# Patient Record
Sex: FEMALE | Race: WHITE | NOT HISPANIC OR LATINO | Employment: OTHER | URBAN - NONMETROPOLITAN AREA
[De-identification: names, ages, dates, MRNs, and addresses within clinical notes are randomized per-mention and may not be internally consistent; named-entity substitution may affect disease eponyms.]

---

## 2020-10-05 ENCOUNTER — TRANSCRIBE ORDERS (OUTPATIENT)
Dept: ADMINISTRATIVE | Facility: HOSPITAL | Age: 65
End: 2020-10-05

## 2020-10-05 ENCOUNTER — LAB (OUTPATIENT)
Dept: LAB | Facility: HOSPITAL | Age: 65
End: 2020-10-05

## 2020-10-05 DIAGNOSIS — E83.52 HYPERCALCEMIA: Primary | ICD-10-CM

## 2020-10-05 DIAGNOSIS — E83.52 HYPERCALCEMIA: ICD-10-CM

## 2020-10-05 LAB — TSH SERPL DL<=0.05 MIU/L-ACNC: 2.58 UIU/ML (ref 0.27–4.2)

## 2020-10-05 PROCEDURE — 84443 ASSAY THYROID STIM HORMONE: CPT

## 2020-10-05 PROCEDURE — 82652 VIT D 1 25-DIHYDROXY: CPT

## 2020-10-05 PROCEDURE — 82397 CHEMILUMINESCENT ASSAY: CPT

## 2020-10-05 PROCEDURE — 84165 PROTEIN E-PHORESIS SERUM: CPT

## 2020-10-05 PROCEDURE — 36415 COLL VENOUS BLD VENIPUNCTURE: CPT

## 2020-10-05 PROCEDURE — 84155 ASSAY OF PROTEIN SERUM: CPT

## 2020-10-06 LAB
ALBUMIN SERPL ELPH-MCNC: 3.9 G/DL (ref 2.9–4.4)
ALBUMIN/GLOB SERPL: 1.3 {RATIO} (ref 0.7–1.7)
ALPHA1 GLOB SERPL ELPH-MCNC: 0.3 G/DL (ref 0–0.4)
ALPHA2 GLOB SERPL ELPH-MCNC: 0.8 G/DL (ref 0.4–1)
B-GLOBULIN SERPL ELPH-MCNC: 1.1 G/DL (ref 0.7–1.3)
GAMMA GLOB SERPL ELPH-MCNC: 0.9 G/DL (ref 0.4–1.8)
GLOBULIN SER CALC-MCNC: 3 G/DL (ref 2.2–3.9)
LABORATORY COMMENT REPORT: NORMAL
M PROTEIN SERPL ELPH-MCNC: NORMAL G/DL
PROT SERPL-MCNC: 6.9 G/DL (ref 6–8.5)

## 2020-10-07 LAB — 1,25(OH)2D3 SERPL-MCNC: 53.3 PG/ML (ref 19.9–79.3)

## 2020-10-15 LAB — PTH RELATED PROT SERPL-SCNC: <2 PMOL/L

## 2021-01-28 ENCOUNTER — HOSPITAL ENCOUNTER (OUTPATIENT)
Dept: HOSPITAL 79 - OR | Age: 66
Discharge: HOME | End: 2021-01-28
Attending: INTERNAL MEDICINE
Payer: COMMERCIAL

## 2021-01-28 DIAGNOSIS — K31.89: ICD-10-CM

## 2021-01-28 DIAGNOSIS — E78.00: ICD-10-CM

## 2021-01-28 DIAGNOSIS — K29.61: ICD-10-CM

## 2021-01-28 DIAGNOSIS — I10: ICD-10-CM

## 2021-01-28 DIAGNOSIS — K70.31: ICD-10-CM

## 2021-01-28 DIAGNOSIS — K76.6: Primary | ICD-10-CM

## 2021-01-28 DIAGNOSIS — Z87.891: ICD-10-CM

## 2021-01-28 DIAGNOSIS — E78.5: ICD-10-CM

## 2021-01-28 DIAGNOSIS — Z79.899: ICD-10-CM

## 2021-01-28 DIAGNOSIS — Z79.4: ICD-10-CM

## 2021-01-28 DIAGNOSIS — E11.9: ICD-10-CM

## 2021-03-17 ENCOUNTER — IMMUNIZATION (OUTPATIENT)
Dept: VACCINE CLINIC | Facility: HOSPITAL | Age: 66
End: 2021-03-17

## 2021-03-17 PROCEDURE — 0001A: CPT | Performed by: INTERNAL MEDICINE

## 2021-03-17 PROCEDURE — 91300 HC SARSCOV02 VAC 30MCG/0.3ML IM: CPT | Performed by: INTERNAL MEDICINE

## 2021-04-07 ENCOUNTER — IMMUNIZATION (OUTPATIENT)
Dept: VACCINE CLINIC | Facility: HOSPITAL | Age: 66
End: 2021-04-07

## 2021-04-07 PROCEDURE — 0002A: CPT | Performed by: INTERNAL MEDICINE

## 2021-04-07 PROCEDURE — 91300 HC SARSCOV02 VAC 30MCG/0.3ML IM: CPT | Performed by: INTERNAL MEDICINE

## 2021-06-25 ENCOUNTER — TRANSCRIBE ORDERS (OUTPATIENT)
Dept: ADMINISTRATIVE | Facility: HOSPITAL | Age: 66
End: 2021-06-25

## 2021-06-25 DIAGNOSIS — Z87.891 PERSONAL HISTORY OF TOBACCO USE, PRESENTING HAZARDS TO HEALTH: Primary | ICD-10-CM

## 2021-07-02 ENCOUNTER — HOSPITAL ENCOUNTER (OUTPATIENT)
Dept: HOSPITAL 79 - US | Age: 66
End: 2021-07-02
Attending: INTERNAL MEDICINE
Payer: COMMERCIAL

## 2021-07-02 DIAGNOSIS — K76.0: ICD-10-CM

## 2021-07-02 DIAGNOSIS — Z90.49: ICD-10-CM

## 2021-07-02 DIAGNOSIS — K70.31: Primary | ICD-10-CM

## 2021-07-07 ENCOUNTER — HOSPITAL ENCOUNTER (OUTPATIENT)
Dept: CT IMAGING | Facility: HOSPITAL | Age: 66
Discharge: HOME | End: 2021-07-07
Admitting: PHYSICIAN ASSISTANT

## 2021-07-07 DIAGNOSIS — Z87.891 PERSONAL HISTORY OF TOBACCO USE, PRESENTING HAZARDS TO HEALTH: ICD-10-CM

## 2021-07-07 PROCEDURE — 71271 CT THORAX LUNG CANCER SCR C-: CPT | Performed by: RADIOLOGY

## 2021-07-07 PROCEDURE — 71271 CT THORAX LUNG CANCER SCR C-: CPT

## 2021-07-15 ENCOUNTER — NURSE NAVIGATOR (OUTPATIENT)
Dept: ONCOLOGY | Facility: HOSPITAL | Age: 66
End: 2021-07-15

## 2021-07-15 ENCOUNTER — TELEPHONE (OUTPATIENT)
Dept: GENERAL RADIOLOGY | Facility: HOSPITAL | Age: 66
End: 2021-07-15

## 2021-07-15 NOTE — PROGRESS NOTES
Nurse Navigator spoke to Kerry at [facility] regarding pt's LDCT scan results.  Pt qualifies to be referred to the lung nodule clinic.  Lung Nodule clinic contact informaion provided. Kerry sated she would make LITZY Zelaya aware.

## 2021-08-31 ENCOUNTER — HOSPITAL ENCOUNTER (OUTPATIENT)
Dept: MAMMOGRAPHY | Facility: HOSPITAL | Age: 66
Discharge: HOME | End: 2021-08-31

## 2021-08-31 ENCOUNTER — HOSPITAL ENCOUNTER (OUTPATIENT)
Dept: BONE DENSITY | Facility: HOSPITAL | Age: 66
Discharge: HOME | End: 2021-08-31

## 2021-08-31 DIAGNOSIS — Z78.0 ASYMPTOMATIC MENOPAUSAL STATE: ICD-10-CM

## 2021-08-31 DIAGNOSIS — Z12.31 VISIT FOR SCREENING MAMMOGRAM: ICD-10-CM

## 2021-08-31 PROCEDURE — 77067 SCR MAMMO BI INCL CAD: CPT | Performed by: RADIOLOGY

## 2021-08-31 PROCEDURE — 77067 SCR MAMMO BI INCL CAD: CPT

## 2021-08-31 PROCEDURE — 77063 BREAST TOMOSYNTHESIS BI: CPT

## 2021-08-31 PROCEDURE — 77063 BREAST TOMOSYNTHESIS BI: CPT | Performed by: RADIOLOGY

## 2021-08-31 PROCEDURE — 77080 DXA BONE DENSITY AXIAL: CPT

## 2021-08-31 PROCEDURE — 77080 DXA BONE DENSITY AXIAL: CPT | Performed by: RADIOLOGY

## 2022-01-19 ENCOUNTER — TRANSCRIBE ORDERS (OUTPATIENT)
Dept: ADMINISTRATIVE | Facility: HOSPITAL | Age: 67
End: 2022-01-19

## 2022-01-19 ENCOUNTER — LAB (OUTPATIENT)
Dept: LAB | Facility: HOSPITAL | Age: 67
End: 2022-01-19

## 2022-01-19 DIAGNOSIS — E83.52 HYPERCALCEMIA: ICD-10-CM

## 2022-01-19 DIAGNOSIS — E83.52 HYPERCALCEMIA: Primary | ICD-10-CM

## 2022-01-19 LAB
PHOSPHATE SERPL-MCNC: 2.5 MG/DL (ref 2.5–4.5)
PTH-INTACT SERPL-MCNC: 74.3 PG/ML (ref 15–65)
TSH SERPL DL<=0.05 MIU/L-ACNC: 2.4 UIU/ML (ref 0.27–4.2)

## 2022-01-19 PROCEDURE — 84443 ASSAY THYROID STIM HORMONE: CPT

## 2022-01-19 PROCEDURE — 36415 COLL VENOUS BLD VENIPUNCTURE: CPT

## 2022-01-19 PROCEDURE — 84100 ASSAY OF PHOSPHORUS: CPT

## 2022-01-19 PROCEDURE — 83970 ASSAY OF PARATHORMONE: CPT

## 2022-01-20 ENCOUNTER — LAB (OUTPATIENT)
Dept: LAB | Facility: HOSPITAL | Age: 67
End: 2022-01-20

## 2022-01-20 DIAGNOSIS — E83.52 HYPERCALCEMIA: ICD-10-CM

## 2022-01-20 PROCEDURE — 81050 URINALYSIS VOLUME MEASURE: CPT

## 2022-01-20 PROCEDURE — 82340 ASSAY OF CALCIUM IN URINE: CPT

## 2022-01-21 LAB
CALCIUM 24H UR-MCNC: 54.8 MG/DL
CALCIUM 24H UR-MRATE: 465.8 MG/24 HR (ref 100–300)
COLLECT DURATION TIME UR: 24 HRS
SPECIMEN VOL 24H UR: 850 ML

## 2022-01-31 ENCOUNTER — TRANSCRIBE ORDERS (OUTPATIENT)
Dept: ADMINISTRATIVE | Facility: HOSPITAL | Age: 67
End: 2022-01-31

## 2022-01-31 DIAGNOSIS — R91.8 PULMONARY NODULES: Primary | ICD-10-CM

## 2022-02-03 ENCOUNTER — OFFICE VISIT (OUTPATIENT)
Dept: ENDOCRINOLOGY | Facility: CLINIC | Age: 67
End: 2022-02-03

## 2022-02-03 ENCOUNTER — LAB (OUTPATIENT)
Dept: LAB | Facility: HOSPITAL | Age: 67
End: 2022-02-03

## 2022-02-03 VITALS
SYSTOLIC BLOOD PRESSURE: 164 MMHG | OXYGEN SATURATION: 97 % | DIASTOLIC BLOOD PRESSURE: 74 MMHG | HEIGHT: 63 IN | WEIGHT: 184 LBS | TEMPERATURE: 98.1 F | HEART RATE: 77 BPM | BODY MASS INDEX: 32.6 KG/M2

## 2022-02-03 DIAGNOSIS — E21.3 HYPERPARATHYROIDISM: Primary | ICD-10-CM

## 2022-02-03 PROCEDURE — 99204 OFFICE O/P NEW MOD 45 MIN: CPT | Performed by: NURSE PRACTITIONER

## 2022-02-03 PROCEDURE — 82330 ASSAY OF CALCIUM: CPT | Performed by: NURSE PRACTITIONER

## 2022-02-03 PROCEDURE — 36415 COLL VENOUS BLD VENIPUNCTURE: CPT | Performed by: NURSE PRACTITIONER

## 2022-02-03 RX ORDER — LANCETS
EACH MISCELLANEOUS
Status: ON HOLD | COMMUNITY
Start: 2022-01-31 | End: 2022-09-07

## 2022-02-03 RX ORDER — FUROSEMIDE 40 MG/1
40 TABLET ORAL DAILY
COMMUNITY
Start: 2022-01-14

## 2022-02-03 RX ORDER — INSULIN DEGLUDEC INJECTION 100 U/ML
18 INJECTION, SOLUTION SUBCUTANEOUS NIGHTLY
COMMUNITY
Start: 2021-11-22 | End: 2022-08-03 | Stop reason: SDUPTHER

## 2022-02-03 RX ORDER — FELODIPINE 5 MG/1
5 TABLET, EXTENDED RELEASE ORAL DAILY
COMMUNITY
Start: 2022-01-14

## 2022-02-03 RX ORDER — CHOLECALCIFEROL (VITAMIN D3) 50 MCG
2000 TABLET ORAL WEEKLY
COMMUNITY

## 2022-02-03 RX ORDER — LINAGLIPTIN 5 MG/1
5 TABLET, FILM COATED ORAL DAILY
COMMUNITY
Start: 2022-01-14 | End: 2022-08-03 | Stop reason: SDUPTHER

## 2022-02-03 RX ORDER — EZETIMIBE 10 MG/1
10 TABLET ORAL DAILY
COMMUNITY
Start: 2022-01-15

## 2022-02-03 RX ORDER — CITALOPRAM 40 MG/1
40 TABLET ORAL DAILY
COMMUNITY
Start: 2022-01-14

## 2022-02-03 RX ORDER — FLURBIPROFEN SODIUM 0.3 MG/ML
SOLUTION/ DROPS OPHTHALMIC
COMMUNITY
Start: 2022-01-14 | End: 2022-08-03 | Stop reason: SDUPTHER

## 2022-02-03 NOTE — ASSESSMENT & PLAN NOTE
Discussed in length with the patient her lab findings.  Based on elevated calcium, osteoporosis, increased urine calcium and her symptoms, she will qualify for surgical evaluation.  Will refer to ENT for surgical eval.  Follow-up in 6 months.

## 2022-02-03 NOTE — PROGRESS NOTES
Chief Complaint   Patient presents with   • Abnormal Lab     initial encounter        Referring Provider  Taylor Zuniga, FIDELINA YATES   Diana Cassidy is a 66 y.o. female had concerns including Abnormal Lab (initial encounter).      Reports that back in the 1990's she had high calcium on her labs and that she has never been evaluated for this until her most recent visit with a new PCP and they recommended referral to endo for further evaluation.    Her most recent labs are as follows:  1/20/2022  24 urine calcium: 465.8 H (100-300)  PTH: 74.3 H (15-65)  1/14/2022  Calcium: 12.5 H (8.7-10.3)  Vit D: 39.6 ()  08/2021: DEXA scan resulted osteoporotic with high fracture risk.  She has not had an ionized calcium.     Patient reports that diagnosis of hyperparathyroidism was made 90's  Patient is not taking calcium supplements or OTC medications containing calcium, such as TUMS.   Patient is taking vitamin D supplementation.    Patient has history of hypertension treated with HCTZ in the past, not in current medication regimen.  Patient has no history of kidney dysfunction.  Patient has no history of kidney stones.  Patient has history of osteoporosis.  Patient has no family history of calcium disorders.  Patient reports <1 servings per day of dairy.     No past medical history on file.  No past surgical history on file.   Family History   Problem Relation Age of Onset   • Breast cancer Neg Hx       Social History     Socioeconomic History   • Marital status:       No Known Allergies   Current Outpatient Medications on File Prior to Visit   Medication Sig Dispense Refill   • Accu-Chek Softclix Lancets lancets      • B-D UF III MINI PEN NEEDLES 31G X 5 MM misc      • Cholecalciferol (Vitamin D) 50 MCG (2000 UT) tablet Take 2,000 Units by mouth Daily.     • citalopram (CeleXA) 40 MG tablet      • ezetimibe (ZETIA) 10 MG tablet      • felodipine (PLENDIL) 5 MG 24 hr tablet      • furosemide (LASIX) 40 MG  "tablet      • metFORMIN (GLUCOPHAGE) 1000 MG tablet      • metoprolol tartrate (LOPRESSOR) 25 MG tablet      • Tradjenta 5 MG tablet tablet      • Tresiba FlexTouch 100 UNIT/ML solution pen-injector injection        No current facility-administered medications on file prior to visit.        The following portions of the patient's history were reviewed and updated as appropriate: allergies, current medications, past family history, past medical history, past social history, past surgical history and problem list.    Review of Systems   Constitutional: Positive for fatigue. Negative for activity change, appetite change, chills, diaphoresis, fever, unexpected weight gain and unexpected weight loss.   Eyes: Positive for blurred vision and visual disturbance.   Cardiovascular: Negative for palpitations.   Gastrointestinal: Positive for diarrhea.   Endocrine: Negative for cold intolerance, heat intolerance, polydipsia, polyphagia and polyuria.   Musculoskeletal: Negative.    Skin: Positive for dry skin.   Neurological: Positive for dizziness, weakness, light-headedness, numbness, memory problem and confusion. Negative for headache.   Hematological: Bruises/bleeds easily.   Psychiatric/Behavioral: Negative for agitation and sleep disturbance. The patient is not nervous/anxious.    All other systems reviewed and are negative.     /74 (BP Location: Right arm, Patient Position: Sitting, Cuff Size: Adult)   Pulse 77   Temp 98.1 °F (36.7 °C) (Oral)   Ht 160 cm (63\")   Wt 83.5 kg (184 lb)   LMP  (LMP Unknown)   SpO2 97%   Breastfeeding No   BMI 32.59 kg/m²      Physical Exam  Vitals reviewed.   Constitutional:       Appearance: Normal appearance.   Eyes:      Extraocular Movements: Extraocular movements intact.   Neck:      Thyroid: No thyroid mass or thyroid tenderness.      Comments: Mild thyromegaly, no palpable nodules.  Cardiovascular:      Rate and Rhythm: Normal rate and regular rhythm.      Pulses: Normal " pulses.      Heart sounds: Normal heart sounds.   Pulmonary:      Effort: Pulmonary effort is normal.      Breath sounds: Normal breath sounds.   Skin:     General: Skin is warm.   Neurological:      Mental Status: She is alert and oriented to person, place, and time.   Psychiatric:         Mood and Affect: Mood normal.         Behavior: Behavior normal.         Thought Content: Thought content normal.         Judgment: Judgment normal.        Labs/Imaging  CMP:  Lab Results   Component Value Date    PROTENTOTREF 6.9 10/05/2020    ALBUMIN 3.9 10/05/2020    LABGLOBREF 3.0 10/05/2020    LABIL2 1.3 10/05/2020     Lipid Panel:  No results found for: CHOL, TRIG, HDL, VLDL, LDL  HbA1c:     Glucose:  No results found for: POCGLU  TSH:  Lab Results   Component Value Date    TSH 2.400 01/19/2022       Assessment and Plan    Diagnoses and all orders for this visit:    1. Hyperparathyroidism (HCC) (Primary)  Assessment & Plan:  Discussed in length with the patient her lab findings.  Based on elevated calcium, osteoporosis, increased urine calcium and her symptoms, she will qualify for surgical evaluation.  Will refer to ENT for surgical eval.  Follow-up in 6 months.    Orders:  -     Calcium, Ionized  -     Ambulatory Referral to ENT (Otolaryngology)       Return in about 6 months (around 8/3/2022) for Follow-up appointment. The patient was instructed to contact the clinic with any interval questions or concerns.    FIDELINA Marks   Endocrinology

## 2022-02-04 LAB — CA-I SERPL ISE-MCNC: 6.7 MG/DL (ref 4.5–5.6)

## 2022-02-11 ENCOUNTER — HOSPITAL ENCOUNTER (OUTPATIENT)
Dept: CT IMAGING | Facility: HOSPITAL | Age: 67
Discharge: HOME | End: 2022-02-11
Admitting: INTERNAL MEDICINE

## 2022-02-11 DIAGNOSIS — R91.8 PULMONARY NODULES: ICD-10-CM

## 2022-02-11 PROCEDURE — 71250 CT THORAX DX C-: CPT

## 2022-02-11 PROCEDURE — 71250 CT THORAX DX C-: CPT | Performed by: RADIOLOGY

## 2022-03-03 ENCOUNTER — TRANSCRIBE ORDERS (OUTPATIENT)
Dept: ADMINISTRATIVE | Facility: HOSPITAL | Age: 67
End: 2022-03-03

## 2022-03-03 DIAGNOSIS — D35.1 PARATHYROID ADENOMA: Primary | ICD-10-CM

## 2022-03-21 ENCOUNTER — HOSPITAL ENCOUNTER (OUTPATIENT)
Dept: NUCLEAR MEDICINE | Facility: HOSPITAL | Age: 67
Discharge: HOME | End: 2022-03-21

## 2022-03-21 ENCOUNTER — HOSPITAL ENCOUNTER (OUTPATIENT)
Dept: ULTRASOUND IMAGING | Facility: HOSPITAL | Age: 67
Discharge: HOME | End: 2022-03-21

## 2022-03-21 DIAGNOSIS — D35.1 PARATHYROID ADENOMA: ICD-10-CM

## 2022-03-21 PROCEDURE — 76536 US EXAM OF HEAD AND NECK: CPT | Performed by: RADIOLOGY

## 2022-03-21 PROCEDURE — 78070 PARATHYROID PLANAR IMAGING: CPT

## 2022-03-21 PROCEDURE — 78070 PARATHYROID PLANAR IMAGING: CPT | Performed by: RADIOLOGY

## 2022-03-21 PROCEDURE — A9500 TC99M SESTAMIBI: HCPCS | Performed by: OTOLARYNGOLOGY

## 2022-03-21 PROCEDURE — 76536 US EXAM OF HEAD AND NECK: CPT

## 2022-03-21 PROCEDURE — 0 TECHNETIUM SESTAMIBI: Performed by: OTOLARYNGOLOGY

## 2022-03-21 RX ADMIN — TECHNETIUM TC 99M SESTAMIBI 1 DOSE: 1 INJECTION INTRAVENOUS at 09:03

## 2022-07-06 ENCOUNTER — HOSPITAL ENCOUNTER (OUTPATIENT)
Dept: HOSPITAL 79 - US | Age: 67
End: 2022-07-06
Attending: INTERNAL MEDICINE
Payer: COMMERCIAL

## 2022-07-06 DIAGNOSIS — K70.30: Primary | ICD-10-CM

## 2022-08-03 ENCOUNTER — SPECIALTY PHARMACY (OUTPATIENT)
Dept: PHARMACY | Facility: HOSPITAL | Age: 67
End: 2022-08-03

## 2022-08-03 ENCOUNTER — LAB (OUTPATIENT)
Dept: LAB | Facility: HOSPITAL | Age: 67
End: 2022-08-03

## 2022-08-03 ENCOUNTER — OFFICE VISIT (OUTPATIENT)
Dept: ENDOCRINOLOGY | Facility: CLINIC | Age: 67
End: 2022-08-03

## 2022-08-03 VITALS
WEIGHT: 187 LBS | HEART RATE: 74 BPM | SYSTOLIC BLOOD PRESSURE: 142 MMHG | OXYGEN SATURATION: 96 % | DIASTOLIC BLOOD PRESSURE: 70 MMHG | BODY MASS INDEX: 33.13 KG/M2 | HEIGHT: 63 IN

## 2022-08-03 DIAGNOSIS — E21.3 HYPERPARATHYROIDISM: Primary | ICD-10-CM

## 2022-08-03 DIAGNOSIS — E11.65 TYPE 2 DIABETES MELLITUS WITH HYPERGLYCEMIA, WITH LONG-TERM CURRENT USE OF INSULIN: Primary | ICD-10-CM

## 2022-08-03 DIAGNOSIS — Z79.4 TYPE 2 DIABETES MELLITUS WITH HYPERGLYCEMIA, WITH LONG-TERM CURRENT USE OF INSULIN: Primary | ICD-10-CM

## 2022-08-03 DIAGNOSIS — E11.65 TYPE 2 DIABETES MELLITUS WITH HYPERGLYCEMIA, WITH LONG-TERM CURRENT USE OF INSULIN: ICD-10-CM

## 2022-08-03 DIAGNOSIS — Z79.4 TYPE 2 DIABETES MELLITUS WITH HYPERGLYCEMIA, WITH LONG-TERM CURRENT USE OF INSULIN: ICD-10-CM

## 2022-08-03 LAB
ALBUMIN SERPL-MCNC: 4.44 G/DL (ref 3.5–5.2)
ALBUMIN/GLOB SERPL: 2.1 G/DL
ALP SERPL-CCNC: 70 U/L (ref 39–117)
ALT SERPL W P-5'-P-CCNC: 23 U/L (ref 1–33)
ANION GAP SERPL CALCULATED.3IONS-SCNC: 9.1 MMOL/L (ref 5–15)
AST SERPL-CCNC: 35 U/L (ref 1–32)
BILIRUB SERPL-MCNC: 0.2 MG/DL (ref 0–1.2)
BUN SERPL-MCNC: 16 MG/DL (ref 8–23)
BUN/CREAT SERPL: 23.9 (ref 7–25)
CALCIUM SPEC-SCNC: 12.1 MG/DL (ref 8.6–10.5)
CHLORIDE SERPL-SCNC: 104 MMOL/L (ref 98–107)
CO2 SERPL-SCNC: 25.9 MMOL/L (ref 22–29)
CREAT SERPL-MCNC: 0.67 MG/DL (ref 0.57–1)
EGFRCR SERPLBLD CKD-EPI 2021: 94.7 ML/MIN/1.73
EXPIRATION DATE: ABNORMAL
GLOBULIN UR ELPH-MCNC: 2.2 GM/DL
GLUCOSE BLDC GLUCOMTR-MCNC: 247 MG/DL (ref 70–130)
GLUCOSE SERPL-MCNC: 228 MG/DL (ref 65–99)
HBA1C MFR BLD: 8.4 %
Lab: ABNORMAL
POTASSIUM SERPL-SCNC: 4.7 MMOL/L (ref 3.5–5.2)
PROT SERPL-MCNC: 6.6 G/DL (ref 6–8.5)
PTH-INTACT SERPL-MCNC: 65.9 PG/ML (ref 15–65)
SODIUM SERPL-SCNC: 139 MMOL/L (ref 136–145)

## 2022-08-03 PROCEDURE — 36415 COLL VENOUS BLD VENIPUNCTURE: CPT | Performed by: NURSE PRACTITIONER

## 2022-08-03 PROCEDURE — 83970 ASSAY OF PARATHORMONE: CPT | Performed by: NURSE PRACTITIONER

## 2022-08-03 PROCEDURE — 99214 OFFICE O/P EST MOD 30 MIN: CPT | Performed by: NURSE PRACTITIONER

## 2022-08-03 PROCEDURE — 80053 COMPREHEN METABOLIC PANEL: CPT | Performed by: NURSE PRACTITIONER

## 2022-08-03 PROCEDURE — 83036 HEMOGLOBIN GLYCOSYLATED A1C: CPT | Performed by: NURSE PRACTITIONER

## 2022-08-03 PROCEDURE — 82962 GLUCOSE BLOOD TEST: CPT | Performed by: NURSE PRACTITIONER

## 2022-08-03 PROCEDURE — 3052F HG A1C>EQUAL 8.0%<EQUAL 9.0%: CPT | Performed by: NURSE PRACTITIONER

## 2022-08-03 RX ORDER — INSULIN DEGLUDEC INJECTION 100 U/ML
18 INJECTION, SOLUTION SUBCUTANEOUS NIGHTLY
Qty: 15 ML | Refills: 1 | Status: SHIPPED | OUTPATIENT
Start: 2022-08-03 | End: 2022-11-03 | Stop reason: SDUPTHER

## 2022-08-03 RX ORDER — LINAGLIPTIN 5 MG/1
5 TABLET, FILM COATED ORAL DAILY
Qty: 90 TABLET | Refills: 1 | Status: SHIPPED | OUTPATIENT
Start: 2022-08-03 | End: 2022-11-03 | Stop reason: SDUPTHER

## 2022-08-03 RX ORDER — FLURBIPROFEN SODIUM 0.3 MG/ML
SOLUTION/ DROPS OPHTHALMIC
Qty: 100 EACH | Refills: 1 | Status: ON HOLD | OUTPATIENT
Start: 2022-08-03 | End: 2022-09-07

## 2022-08-03 RX ORDER — DAPAGLIFLOZIN 5 MG/1
5 TABLET, FILM COATED ORAL DAILY
Qty: 90 TABLET | Refills: 1 | Status: SHIPPED | OUTPATIENT
Start: 2022-08-03 | End: 2022-11-03

## 2022-08-03 NOTE — ASSESSMENT & PLAN NOTE
-Diabetes is above goal with A1c 8.4.  -Discussed dietary and exercise guidelines.  She would like to speak with diabetes education.  Will place referral for this.  -Discussed importance of yearly eye exams.  -Discussed importance of checking BG's regularly.  -Continue Metformin 1,000 mg BID.  -Continue Tradjenta 5 mg QD.  -Continue Tresiba 18 units qhs.  -Start Farxiga 5 mg QD. Discussed the medication's MOA and that it may cause more frequent urination particularly upon starting the medication. Take one tablet in the morning with or without food. Encouraged to drink plenty of water as to not get dehydrated especially in warmer weather or with activity. Also discussed there may be an increased incidence of UTIs or yeast infections and to monitor for signs/symptoms.  -S/S hypoglycemia reviewed with Rule of 15's advised.  -Follow-up in 3 months.

## 2022-08-03 NOTE — PROGRESS NOTES
Chief Complaint   Patient presents with   • Hyperthyroidism        Diana Cassidy is a 67 y.o. female had concerns including Hyperthyroidism.    She is being seen for her hyperparathyroidism.  She is asking us to start caring for her diabetes as well.    She would like for us to treat her diabetes as well.     Diabetes was diagnosed .  Complications include none.  Last ophtho exam was , Dr. Morillo.  Current medications for diabetes include Metformin 1,000  Mg BID, Tradjenta 5 mg QD, Tresiba 18 units qhs.  She checks her blood sugar one times per day.   Hypos: none  FSB+  PPD: 200+    Diet: She eats what she wants, she is not limiting much.    She is scheduled for surgery on her parathyroids on .  She is having surgery here at [facility].     Reports that back in the  she had high calcium on her labs and that she has never been evaluated for this until her most recent visit with a new PCP and they recommended referral to endo for further evaluation.    Her most recent labs are as follows:  2022  24 urine calcium: 465.8 H (100-300)  PTH: 74.3 H (15-65)  2022  Calcium: 12.5 H (8.7-10.3)  Vit D: 39.6 ()  2021: DEXA scan resulted osteoporotic with high fracture risk.  She has not had an ionized calcium.     Patient reports that diagnosis of hyperparathyroidism was made   Patient is not taking calcium supplements or OTC medications containing calcium, such as TUMS.   Patient is taking vitamin D supplementation.    Patient has history of hypertension treated with HCTZ in the past, not in current medication regimen.  Patient has no history of kidney dysfunction.  Patient has no history of kidney stones.  Patient has history of osteoporosis.  Patient has no family history of calcium disorders.  Patient reports <1 servings per day of dairy.     Past Medical History:   Diagnosis Date   • Hyperlipidemia NA    Don't remember   • Hypertension    • Vitamin D deficiency  2020     No past surgical history on file.   Family History   Problem Relation Age of Onset   • Cancer Mother         Lung cancer   • Cancer Father         Pancreatic/ Lung Cancer   • Cancer Sister         Lymphoma/bladder   • Cancer Sister         Bladder Cancer   • Breast cancer Neg Hx       Social History     Socioeconomic History   • Marital status:    Tobacco Use   • Smoking status: Former Smoker     Packs/day: 1.50     Years: 30.00     Pack years: 45.00     Types: Cigarettes     Quit date: 5/9/2012     Years since quitting: 10.2   Substance and Sexual Activity   • Alcohol use: Not Currently   • Drug use: Never   • Sexual activity: Not Currently     Birth control/protection: Surgical      No Known Allergies   Current Outpatient Medications on File Prior to Visit   Medication Sig Dispense Refill   • Accu-Chek Softclix Lancets lancets      • Cholecalciferol (Vitamin D) 50 MCG (2000 UT) tablet Take 2,000 Units by mouth Daily.     • citalopram (CeleXA) 40 MG tablet      • ezetimibe (ZETIA) 10 MG tablet      • felodipine (PLENDIL) 5 MG 24 hr tablet      • furosemide (LASIX) 40 MG tablet      • metoprolol tartrate (LOPRESSOR) 25 MG tablet Take 25 mg by mouth 2 (Two) Times a Day.     • [DISCONTINUED] B-D UF III MINI PEN NEEDLES 31G X 5 MM misc      • [DISCONTINUED] metFORMIN (GLUCOPHAGE) 1000 MG tablet Take 1,000 mg by mouth 2 (Two) Times a Day With Meals.     • [DISCONTINUED] Tradjenta 5 MG tablet tablet 5 mg Daily.     • [DISCONTINUED] Tresiba FlexTouch 100 UNIT/ML solution pen-injector injection Inject 18 Units under the skin into the appropriate area as directed Every Night.       No current facility-administered medications on file prior to visit.        The following portions of the patient's history were reviewed and updated as appropriate: allergies, current medications, past family history, past medical history, past social history, past surgical history and problem list.    Review of Systems  "  Constitutional: Positive for fatigue. Negative for activity change, appetite change, chills, diaphoresis, fever, unexpected weight gain and unexpected weight loss.   Eyes: Positive for blurred vision and visual disturbance.   Cardiovascular: Negative for palpitations.   Gastrointestinal: Positive for diarrhea.   Endocrine: Negative for cold intolerance, heat intolerance, polydipsia, polyphagia and polyuria.   Musculoskeletal: Negative.    Skin: Positive for dry skin.   Neurological: Positive for dizziness, weakness, light-headedness, numbness, memory problem and confusion. Negative for headache.   Hematological: Bruises/bleeds easily.   Psychiatric/Behavioral: Negative for agitation and sleep disturbance. The patient is not nervous/anxious.    All other systems reviewed and are negative.     /70 (BP Location: Right arm, Patient Position: Sitting, Cuff Size: Adult)   Pulse 74   Ht 160 cm (63\")   Wt 84.8 kg (187 lb)   LMP  (LMP Unknown)   SpO2 96%   BMI 33.13 kg/m²      Physical Exam  Vitals reviewed.   Constitutional:       Appearance: Normal appearance.   Eyes:      Extraocular Movements: Extraocular movements intact.   Neck:      Thyroid: No thyroid mass or thyroid tenderness.      Comments: Mild thyromegaly, no palpable nodules.  Cardiovascular:      Rate and Rhythm: Normal rate and regular rhythm.      Pulses: Normal pulses.           Dorsalis pedis pulses are 2+ on the right side and 2+ on the left side.        Posterior tibial pulses are 2+ on the right side and 2+ on the left side.      Heart sounds: Normal heart sounds.   Pulmonary:      Effort: Pulmonary effort is normal.      Breath sounds: Normal breath sounds.   Feet:      Right foot:      Protective Sensation: 10 sites tested. 10 sites sensed.      Skin integrity: Dry skin present.      Toenail Condition: Right toenails are normal.      Left foot:      Protective Sensation: 10 sites tested. 10 sites sensed.      Skin integrity: Dry skin " present.      Toenail Condition: Left toenails are normal.      Comments: Diabetic Foot Exam Performed and Monofilament Test Performed  Skin:     General: Skin is warm.   Neurological:      Mental Status: She is alert and oriented to person, place, and time.   Psychiatric:         Mood and Affect: Mood normal.         Behavior: Behavior normal.         Thought Content: Thought content normal.         Judgment: Judgment normal.        Labs/Imaging  CMP:  Lab Results   Component Value Date    BUN 16 08/03/2022    CREATININE 0.67 08/03/2022    BCR 23.9 08/03/2022     08/03/2022    K 4.7 08/03/2022    CO2 25.9 08/03/2022    CALCIUM 12.1 (H) 08/03/2022    PROTENTOTREF 6.9 10/05/2020    ALBUMIN 4.44 08/03/2022    LABGLOBREF 3.0 10/05/2020    LABIL2 1.3 10/05/2020    BILITOT 0.2 08/03/2022    ALKPHOS 70 08/03/2022    AST 35 (H) 08/03/2022    ALT 23 08/03/2022     Lipid Panel:  No results found for: CHOL, TRIG, HDL, VLDL, LDL  HbA1c:  Lab Results   Component Value Date    HGBA1C 8.4 08/03/2022     Glucose:  Lab Results   Component Value Date    POCGLU 247 (A) 08/03/2022     TSH:  Lab Results   Component Value Date    TSH 2.400 01/19/2022       Assessment and Plan    Diagnoses and all orders for this visit:    1. Hyperparathyroidism (HCC) (Primary)  Assessment & Plan:  Patient is to continue with surgical procedure upcoming.  Will monitor and follow as indicated.    Orders:  -     Comprehensive Metabolic Panel  -     PTH, Intact    2. Type 2 diabetes mellitus with hyperglycemia, with long-term current use of insulin (HCC)  Assessment & Plan:  -Diabetes is above goal with A1c 8.4.  -Discussed dietary and exercise guidelines.  She would like to speak with diabetes education.  Will place referral for this.  -Discussed importance of yearly eye exams.  -Discussed importance of checking BG's regularly.  -Continue Metformin 1,000 mg BID.  -Continue Tradjenta 5 mg QD.  -Continue Tresiba 18 units qhs.  -Start Farxiga 5 mg QD.  Discussed the medication's MOA and that it may cause more frequent urination particularly upon starting the medication. Take one tablet in the morning with or without food. Encouraged to drink plenty of water as to not get dehydrated especially in warmer weather or with activity. Also discussed there may be an increased incidence of UTIs or yeast infections and to monitor for signs/symptoms.  -S/S hypoglycemia reviewed with Rule of 15's advised.  -Follow-up in 3 months.      Orders:  -     POC Glucose Fingerstick  -     POC Glycosylated Hemoglobin (Hb A1C)  -     Ambulatory Referral to Diabetic Education       Return in about 3 months (around 11/3/2022) for Follow-up appointment, A1C. The patient was instructed to contact the clinic with any interval questions or concerns.    This document has been electronically signed by FIDELINA Marks  August 3, 2022 12:46 EDT  Endocrinology

## 2022-08-03 NOTE — PROGRESS NOTES
Specialty Pharmacy Patient Management Program  Endocrinology Initial Assessment       Diana Cassidy is a 67 y.o. female with Type 2 Diabetes seen by an Endocrinology provider and enrolled in the Endocrinology Patient Management program offered by [facility].  An initial outreach was conducted, including assessment of therapy appropriateness and specialty medication education for Tradjenta, Metformin, and Tresiba. The patient was introduced to services offered by [facility], including: regular assessments, refill coordination, curbside pick-up or mail order delivery options, prior authorization maintenance, and financial assistance programs as applicable. The patient was also provided with contact information for the pharmacy team.     Patient is currently taking Metformin 1,000 mg twice daily, Tradjenta 5 mg daily, and Tresiba 18 units at night. She checks her BG once daily with BG >200. She denies low BG <70.     Patient denies personal/family history of thyroid cancer, denies issues with pancreas/pancreatitis, and denies issues with recurrent UTI/yeast infections. She hasn't tried any other medications in the past. In the past she was misdiagnosed with cirrhosis of the liver. Patient states that the specialist she sees now informed her of the misdiagnosis. He routinely follows her labs.     Insurance Coverage & Financial Support  Medicare - Patient needs assistance with medication costs.     Relevant Past Medical History and Comorbidities  Relevant medical history and concomitant health conditions were discussed with the patient. The patient's chart has been reviewed for relevant past medical history and comorbid health conditions and updated as necessary.   Past Medical History:   Diagnosis Date   • Hyperlipidemia NA    Don't remember   • Hypertension 2007   • Vitamin D deficiency 2020     Social History     Socioeconomic History   • Marital status:     Tobacco Use   • Smoking status: Former Smoker     Packs/day: 1.50     Years: 30.00     Pack years: 45.00     Types: Cigarettes     Quit date: 5/9/2012     Years since quitting: 10.2   Substance and Sexual Activity   • Alcohol use: Not Currently   • Drug use: Never   • Sexual activity: Not Currently     Birth control/protection: Surgical       Allergies  Known allergies and reactions were discussed with the patient. The patient's chart has been reviewed for  allergy information and updated as necessary.   Patient has no known allergies.    Current Medication List  This medication list has been reviewed with the patient and evaluated for any interactions or necessary modifications/recommendations, and updated to include all prescription medications, OTC medications, and supplements the patient is currently taking.  This list reflects what is contained in the patient's profile, which has also been marked as reviewed to communicate to other providers it is the most up to date version of the patient's current medication therapy.     Current Outpatient Medications:   •  Accu-Chek Softclix Lancets lancets, , Disp: , Rfl:   •  B-D UF III MINI PEN NEEDLES 31G X 5 MM misc, , Disp: , Rfl:   •  Cholecalciferol (Vitamin D) 50 MCG (2000 UT) tablet, Take 2,000 Units by mouth Daily., Disp: , Rfl:   •  citalopram (CeleXA) 40 MG tablet, , Disp: , Rfl:   •  ezetimibe (ZETIA) 10 MG tablet, , Disp: , Rfl:   •  felodipine (PLENDIL) 5 MG 24 hr tablet, , Disp: , Rfl:   •  furosemide (LASIX) 40 MG tablet, , Disp: , Rfl:   •  metFORMIN (GLUCOPHAGE) 1000 MG tablet, Take 1,000 mg by mouth 2 (Two) Times a Day With Meals., Disp: , Rfl:   •  metoprolol tartrate (LOPRESSOR) 25 MG tablet, Take 25 mg by mouth 2 (Two) Times a Day., Disp: , Rfl:   •  Tradjenta 5 MG tablet tablet, 5 mg Daily., Disp: , Rfl:   •  Tresiba FlexTouch 100 UNIT/ML solution pen-injector injection, Inject 18 Units under the skin into the appropriate area as directed Every  Night., Disp: , Rfl:     Drug Interactions  None relevant to diabetes care    Recommended Medications Assessment  • Aspirin - Indicated, not currently taking  • Statin - Indicated, not currently taking  • ACEi/ARB - Indicated, not currently taking    Current 10-Year ASCVD Risk: cannot calculate - need updated labs.     Relevant Laboratory Values  A1C Last 3 Results    HGBA1C Last 3 Results 8/3/22   Hemoglobin A1C 8.4           Lab Results   Component Value Date    HGBA1C 8.4 08/03/2022     No results found for: GLUCOSE, CALCIUM, NA, K, CO2, CL, BUN, CREATININE, EGFRIFAFRI, EGFRIFNONA, BCR, ANIONGAP  No results found for: CHOL, CHLPL, TRIG, HDL, LDL, LDLDIRECT      Initial Education Provided for Specialty Medication  The patient has been provided with the following education and any applicable administration techniques (i.e. self-injection) have been demonstrated for the therapies indicated. All questions and concerns have been addressed prior to the patient receiving the medication, and the patient has verbalized understanding of the education and any materials provided.  Additional patient education shall be provided and documented upon request by the patient, provider or payer.      TRESIBA® (insulin degludec)   Medication Expectations    Why am I taking this medication?  You are taking this medication to lower blood sugar and A1c because you have type 1 or type 2 diabetes. Diabetes is not curable but with proper medication and treatment, we can keep your blood sugar within your personalized target range.    What should I expect while on this medication?  You should expect to see your blood sugar and A1c decrease over time.    How does the medication work?  Tresiba is a long-acting insulin that slowly and steadily releases after administration for 24-hours of blood sugar control. Insulin helps the sugar in your blood get into cells and provide them with energy to fuel your body.     How long will I be on this  medication for?  If you have Type 1 diabetes, you will be on this medication or another insulin throughout your lifetime because your body cannot make its own insulin. If you have Type 2 diabetes, the amount of time you will be on this medication will be determined by your doctor based on blood sugar and A1c control. You will most likely be on this medication or another diabetes medication throughout your lifetime because your body does not make enough insulin. Do not abruptly stop this medication without talking to your doctor first.     How do I take this medication?  Take as directed on your prescription label. Tresiba is usually given once daily and can be taken with or without food. Tresiba is injected under the skin (subcutaneously) of your stomach, thigh, or upper arm. Use a different injection site in the same body region each day.       When using the FlexTouch pens, prime the needle before each injection by injecting 2 units of insulin into the air. Once the needle is inserted under the skin, continue to press the button until the dial has returned to 0 and for an additional 6 seconds. Then remove the needle and discard. Use a new needle for each injection.   What are some possible side effects?  Tresiba may cause low blood sugar (hypoglycemia). Signs and symptoms that may indicate hypoglycemia include dizziness, sweating, anxiety, irritability, confusion, or headache. The most common side effects of Tresiba include reactions or skin thickening at the injection site, headache, weight gain, swelling of your hands and feet, and cough/runny nose/sore throat.    What happens if I miss a dose?  If you miss a dose, take it as soon as your remember. If it is close to your next dose, skip it. Always make sure there are at least 8 hours between doses and never take 2 doses at once.       Medication Safety    What are things I should warn my doctor immediately about?  Talk to your doctor if you are pregnant,  planning to become pregnant, or breastfeeding. Also tell your doctor if you notice any signs/symptoms of an allergic reaction (rash, hives, difficulty breathing, etc.).    What are things that I should be cautious of?  Be cautious of any side effects from this medication. Talk to your doctor if any new ones develop or aren't getting better.    What are some medications that can interact with this one?  Do not take this medication with rosiglitazone. Taking Tresiba with other medications that lower your blood sugar such as SGLT-2 inhibitors (Jardiance, Invokana, Farxiga, etc.), GLP-1 agonists (Ozempic, Trulicity, Victoza, etc.), glipizide/glimepiride/glyburide, pioglitazone, and others may increase the risk of low blood sugar. Your doctor may reduce the dose of these medications when you start Tresiba to minimize low blood sugars. Always tell your doctor or pharmacist immediately if you start taking any new medications, including over-the-counter medications, vitamins, and herbal supplements.        Medication Storage/Handling    How should I handle this medication?  Keep this medication out of reach of pets/children and keep the pen capped when not in use. Do not share your medicine with others.     How does this medication need to be stored?  Store your new, unused pens in the original carton in the refrigerator. Protect it from light. Do not freeze. You may store your opened Tresiba at room temperature or in the refrigerator for up to 56 days (8 weeks). Always remove the needle from the pen before storing.     How should I dispose of this medication?  Used Tresiba pens should be thrown away after 56 days even if insulin remains.?Place your used pen and needle in an approved sharps container. If you do not have a sharps container, you may use a household container made of heavy-duty plastic with a tight-fitting lid that is leak resistant (e.g.,?heavy-duty?plastic laundry detergent bottle).?If your doctor decides to  stop this medication, take to your local police station for proper disposal. Some pharmacies also have?take-back bins for medication drop-off.??       Resources/Support    How can I remind myself to take this medication?  You can download reminder apps to help you manage your refills. You may also set an alarm on your phone to remind you.     Is financial support available?   [employer] can provide co-pay cards if you have commercial insurance or patient assistance if you have Medicare or no insurance.     Which vaccines are recommended for me?  Talk to your doctor about these vaccines: Flu, Coronavirus (COVID-19), Pneumococcal (pneumonia), Tdap, Hepatitis B, Zoster (shingles)          FARXIGA® (dapagliflozin)  Medication Expectations   Why am I taking this medication? You are taking Farxiga to lower blood sugar because you have type 2 diabetes. Diabetes is not curable but with proper medication and treatment, we can keep your blood sugar within your personalized target range. This medication also helps reduce the risk of progression of kidney disease, reduce the risk of death from heart attack or stroke, and reduce the risk of heart failure hospitalization.    What should I expect while on this medication? You should expect to see your blood sugar and A1c decrease over time. You may also see a decrease in your blood pressure and it can help some people lose weight.     How does the medication work? Farxiga works by helping to remove some sugar that the body doesn't need through urination.    How long will I be on this medication for? The amount of time you will be on this medication will be determined by your doctor based on blood sugar and A1c control. You will most likely be on this medication or another diabetes medication throughout your lifetime. Do not abruptly stop this medication without talking to your doctor first.    How do I take this medication? Take as directed on your prescription label. This  medication is usually taken in the morning and can be given with or without food.    What are some possible side effects? You may notice increased urination, especially when you first start Farxiga. Stuffy or runny nose can also occur. The most common side effects are urinary tract infections and yeast infections and are more commonly seen in females. Talk with your doctor if you notice white or yellow vaginal discharge, vaginal itching or odor of if you notice redness, itching, pain, or swelling of the penis and/or bad-smelling discharge from the penis.    What happens if I miss a dose? If you miss a dose, take it as soon as you remember. If it is close to your next dose, skip it (do not take 2 doses at once)     Medication Safety   What are things I should warn my doctor immediately about? Tell your doctor if you have kidney disease, liver disease, heart failure, pancreas problems, or history of frequent genital yeast or urinary tract infections. Tell your doctor if you are on a low-salt diet, if you drink alcohol, or if you are having surgery. Talk to your doctor if you are pregnant, planning to become pregnant, or breastfeeding. Also tell your doctor if you notice any signs/symptoms of an allergic reaction (rash, hives, difficulty breathing, etc.).   What are things that I should be cautious of? Be cautious of any side effects from this medication. Talk to your doctor if any new ones develop or aren't getting better.   What are some medications that can interact with this one? Some medications that interact include diuretics (water pills) and other medications that may also lower your blood sugar such as insulins and glipizide/glimepiride/glyburide. Your doctor may reduce the dose of these medications when you start Farxiga to minimize low blood sugars. Always tell your doctor or pharmacist immediately if you start taking any new medications, including over-the-counter medications, vitamins, and herbal  supplements.      Medication Storage/Handling   How should I handle this medication? Keep this medication out of reach of pets/children in tightly sealed container   How does this medication need to be stored? Store at room temperature and keep dry (don't keep in bathroom or other room with moisture)   How should I dispose of this medication? There should not be a need to dispose of this medication unless your provider decides to change the dose or therapy. If that is the case, take to your local police station for proper disposal. Some pharmacies also have take-back bins for medication drop-off.      Resources/Support   How can I remind myself to take this medication? You can download reminder apps to help you manage your refills. You may also set an alarm on your phone to remind you. The pharmacy carries pill boxes that you can place next to an area you pass everyday (such as where you place your car keys or where you charge your phone)   Is financial support available?  [employer] can provide co-pay cards if you have commercial insurance or patient assistance if you have Medicare or no insurance.    Which vaccines are recommended for me? Talk to your doctor about these vaccines: Flu, Coronavirus (COVID-19), Pneumococcal (pneumonia), Tdap, Hepatitis B, Zoster (shingles)        Adherence and Self-Administration  • Barriers to Patient Adherence and/or Self-Administration: Cost  • Methods for Supporting Patient Adherence and/or Self-Administration: Helping patient find assistance     Vaccination Status:   COVID 19: vaccinated  Influenza: not vaccinated  Pneumococcal: not vaccinated  Hep B: not vaccinated    Smoker?  • Former - Quit in 2012    Goals of Therapy  • Patient Goals of Therapy: Take medication everyday   • Clinical Goals or Therapeutic Targets, If Applicable: A1C reduction <7%    Quality of Life Assessment   The patient's current (pre-therapy) quality of life was discussed with the patient. The QOL segment  of this outreach has been reviewed and updated.   • Quality of Life Score: 5    Assessment Plan & Follow-Up  1. Patient's diabetes is not at goal with A1C of 8.4%. Prescriber is starting Farxiga 5 mg daily. Educated patient on medication.    2. She needs help affording her medications. Helping her apply for assistance program. She would like to use our pharmacy. Will send prescriptions to pharmacy and mail to patient.    3. Updated lipid panel would be beneficial to determine appropriate lipid lowering therapy for patient.   4. Recommended appropriate vaccinations - patient declined today.  5. Patient reports that she was misdiagnosed with cirrhosis of the liver in the past. We will contact her providers office to confirm that this was a misdiagnosis. Will continue to monitor and make adjustments to medications as appropriate.   6. Pharmacist to perform regular reassessments no more than (6) months from the previous assessment.  7. Welcome information and patient satisfaction survey to be sent by retail team with patient's initial fill.  8. Care Coordinator to set up future refill outreaches, coordinate prescription delivery, and escalate clinical questions to pharmacist.     Attestation  I attest that the initiated specialty medication(s) are appropriate for the patient based on my assessment.  If the prescribed therapy is at any point deemed not appropriate based on the current or future assessments, a consultation will be initiated with the patient's specialty care provider to determine the best course of action. The revised plan of therapy will be documented along with any additional patient education provided.       Amira Nuñez RPH  8/3/2022  10:07 EDT

## 2022-08-08 ENCOUNTER — EDUCATION (OUTPATIENT)
Dept: DIABETES SERVICES | Facility: HOSPITAL | Age: 67
End: 2022-08-08

## 2022-08-08 NOTE — CONSULTS
Diabetes Education  Assessment/Teaching    Patient Name:  Diana Cassidy  YOB: 1955  MRN: [id number]  Admit Date:  (Not on file)      Assessment Date:  8/8/2022      Flowsheet Row Most Recent Value   DM Education Needs    Meter Has own   Frequency of Testing Daily   Medication Oral   Problem Solving Hypoglycemia, Hyperglycemia, Sick days, Signs, Symptoms, Treatment   Reducing Risks Neuropathy, Cardiovascular, Immunizations, Foot care, Dental exam, Eye exam, Blood pressure, Lipids, A1C testing, Retinopathy   Healthy Eating Basic meal plan provided   Physical Activity Walking   Physical Activity Frequency Occasionally   Healthy Coping Appropriate   Discharge Plan Follow-up with endocrinolgoist   Motivation Moderate   Teaching Method Explanation, Discussion, Handouts   Patient Response Verbalized understanding            Other Comments:  A1C 8.4 Patient is a 67 year old female with a history of HTN, HLD, and diabetes.   Patient received a call and was educated on diet, activity, checking blood glucose, taking medication as prescribed, checking feet daily and S/S of hypo/hyperglycemia. Patient was educated on sick rule days. Patient was mailed a packet of information that includes ADA handouts, AADE 7 handout, fast food book and grocery shopping book for help when shopping along with office contact number.      Healthy eating:  Patient states she will decrease carb intake and will try to eat healthier. Patient states that she will try the plate method and eat better choices.   Being active:   Patient states she will walk at least 15 minutes three times a week as she can tolerate. Patient sates that when she watches TV she will get up and move during commercials to help get activity daily.   Monitoring:   Patient states that she checks her blood glucose daily and as needed.   Medication:  Patient will take medications as prescribed. Patient was able to list her medications and the dose as prescribed.  Patient stated that the new medication prescribed will be in today and she will take it as directed.   Problem solving:  Patient states the signs and symptoms of hypo/hyperglycemia and the treatment for both.   Reducing risk:  Patient states she checks her feet nightly and does not walk without something to protect her feet. Patient states she goes to her follow up appointments and goes to the eye doctor and dentist when needed and on annual visits. Patient states that she no longer smokes and she quit some years ago.   Healthy coping:  Patient has a good support team at home and is able to talk to them for help when needed.   Patient had no questions or concerns. Thank you.   Electronically signed by:  Dana Roper RN  08/08/22 12:42 EDT

## 2022-08-29 ENCOUNTER — SPECIALTY PHARMACY (OUTPATIENT)
Dept: PHARMACY | Facility: HOSPITAL | Age: 67
End: 2022-08-29

## 2022-09-06 ENCOUNTER — PRE-ADMISSION TESTING (OUTPATIENT)
Dept: PREADMISSION TESTING | Facility: HOSPITAL | Age: 67
End: 2022-09-06

## 2022-09-06 DIAGNOSIS — E21.3 HYPERPARATHYROIDISM: ICD-10-CM

## 2022-09-06 LAB
ANION GAP SERPL CALCULATED.3IONS-SCNC: 12.3 MMOL/L (ref 5–15)
BUN SERPL-MCNC: 15 MG/DL (ref 8–23)
BUN/CREAT SERPL: 22.1 (ref 7–25)
CALCIUM SPEC-SCNC: 12.2 MG/DL (ref 8.6–10.5)
CHLORIDE SERPL-SCNC: 102 MMOL/L (ref 98–107)
CO2 SERPL-SCNC: 23.7 MMOL/L (ref 22–29)
CREAT SERPL-MCNC: 0.68 MG/DL (ref 0.57–1)
DEPRECATED RDW RBC AUTO: 44.6 FL (ref 37–54)
EGFRCR SERPLBLD CKD-EPI 2021: 94.4 ML/MIN/1.73
ERYTHROCYTE [DISTWIDTH] IN BLOOD BY AUTOMATED COUNT: 13 % (ref 12.3–15.4)
GLUCOSE SERPL-MCNC: 168 MG/DL (ref 65–99)
HCT VFR BLD AUTO: 41.4 % (ref 34–46.6)
HGB BLD-MCNC: 13.5 G/DL (ref 12–15.9)
MCH RBC QN AUTO: 30.5 PG (ref 26.6–33)
MCHC RBC AUTO-ENTMCNC: 32.6 G/DL (ref 31.5–35.7)
MCV RBC AUTO: 93.5 FL (ref 79–97)
PLATELET # BLD AUTO: 152 10*3/MM3 (ref 140–450)
PMV BLD AUTO: 12.4 FL (ref 6–12)
POTASSIUM SERPL-SCNC: 4.3 MMOL/L (ref 3.5–5.2)
QT INTERVAL: 406 MS
QTC INTERVAL: 435 MS
RBC # BLD AUTO: 4.43 10*6/MM3 (ref 3.77–5.28)
SODIUM SERPL-SCNC: 138 MMOL/L (ref 136–145)
WBC NRBC COR # BLD: 6.16 10*3/MM3 (ref 3.4–10.8)

## 2022-09-06 PROCEDURE — 36415 COLL VENOUS BLD VENIPUNCTURE: CPT

## 2022-09-06 PROCEDURE — 80048 BASIC METABOLIC PNL TOTAL CA: CPT

## 2022-09-06 PROCEDURE — 85027 COMPLETE CBC AUTOMATED: CPT

## 2022-09-06 PROCEDURE — 93005 ELECTROCARDIOGRAM TRACING: CPT

## 2022-09-06 PROCEDURE — 93010 ELECTROCARDIOGRAM REPORT: CPT | Performed by: INTERNAL MEDICINE

## 2022-09-06 NOTE — DISCHARGE INSTRUCTIONS

## 2022-09-07 ENCOUNTER — HOSPITAL ENCOUNTER (OUTPATIENT)
Facility: HOSPITAL | Age: 67
Setting detail: HOSPITAL OUTPATIENT SURGERY
Discharge: HOME | End: 2022-09-07
Attending: OTOLARYNGOLOGY | Admitting: OTOLARYNGOLOGY

## 2022-09-07 ENCOUNTER — ANESTHESIA (OUTPATIENT)
Dept: PERIOP | Facility: HOSPITAL | Age: 67
End: 2022-09-07

## 2022-09-07 ENCOUNTER — ANESTHESIA EVENT (OUTPATIENT)
Dept: PERIOP | Facility: HOSPITAL | Age: 67
End: 2022-09-07

## 2022-09-07 VITALS
BODY MASS INDEX: 32.6 KG/M2 | HEART RATE: 77 BPM | TEMPERATURE: 97.8 F | RESPIRATION RATE: 16 BRPM | HEIGHT: 63 IN | DIASTOLIC BLOOD PRESSURE: 65 MMHG | WEIGHT: 184 LBS | OXYGEN SATURATION: 95 % | SYSTOLIC BLOOD PRESSURE: 152 MMHG

## 2022-09-07 DIAGNOSIS — E21.3 HYPERPARATHYROIDISM: Primary | ICD-10-CM

## 2022-09-07 LAB
GLUCOSE BLDC GLUCOMTR-MCNC: 143 MG/DL (ref 70–130)
GLUCOSE BLDC GLUCOMTR-MCNC: 238 MG/DL (ref 70–130)
PTH-INTACT SERPL-SCNC: 20.7 PG/ML (ref 15–65)
PTH-INTACT SERPL-SCNC: 74 PG/ML (ref 15–65)

## 2022-09-07 PROCEDURE — 25010000002 FENTANYL CITRATE (PF) 50 MCG/ML SOLUTION: Performed by: NURSE ANESTHETIST, CERTIFIED REGISTERED

## 2022-09-07 PROCEDURE — 25010000002 PROPOFOL 10 MG/ML EMULSION: Performed by: NURSE ANESTHETIST, CERTIFIED REGISTERED

## 2022-09-07 PROCEDURE — 25010000002 ONDANSETRON PER 1 MG: Performed by: NURSE ANESTHETIST, CERTIFIED REGISTERED

## 2022-09-07 PROCEDURE — 25010000002 SUCCINYLCHOLINE PER 20 MG: Performed by: NURSE ANESTHETIST, CERTIFIED REGISTERED

## 2022-09-07 PROCEDURE — 83970 ASSAY OF PARATHORMONE: CPT | Performed by: OTOLARYNGOLOGY

## 2022-09-07 PROCEDURE — 88331 PATH CONSLTJ SURG 1 BLK 1SPC: CPT

## 2022-09-07 PROCEDURE — 88305 TISSUE EXAM BY PATHOLOGIST: CPT

## 2022-09-07 PROCEDURE — 25010000002 DEXAMETHASONE PER 1 MG: Performed by: NURSE ANESTHETIST, CERTIFIED REGISTERED

## 2022-09-07 PROCEDURE — 25010000002 CEFAZOLIN PER 500 MG

## 2022-09-07 PROCEDURE — 82962 GLUCOSE BLOOD TEST: CPT

## 2022-09-07 DEVICE — SEAL HEMO SURG ARISTA/AH ABS/PWDR 3GM: Type: IMPLANTABLE DEVICE | Site: NECK | Status: FUNCTIONAL

## 2022-09-07 RX ORDER — ONDANSETRON 2 MG/ML
4 INJECTION INTRAMUSCULAR; INTRAVENOUS AS NEEDED
Status: DISCONTINUED | OUTPATIENT
Start: 2022-09-07 | End: 2022-09-07 | Stop reason: HOSPADM

## 2022-09-07 RX ORDER — SODIUM CHLORIDE 0.9 % (FLUSH) 0.9 %
10 SYRINGE (ML) INJECTION EVERY 12 HOURS SCHEDULED
Status: DISCONTINUED | OUTPATIENT
Start: 2022-09-07 | End: 2022-09-07 | Stop reason: HOSPADM

## 2022-09-07 RX ORDER — LIDOCAINE HYDROCHLORIDE 20 MG/ML
INJECTION, SOLUTION INFILTRATION; PERINEURAL AS NEEDED
Status: DISCONTINUED | OUTPATIENT
Start: 2022-09-07 | End: 2022-09-07 | Stop reason: SURG

## 2022-09-07 RX ORDER — ONDANSETRON 2 MG/ML
INJECTION INTRAMUSCULAR; INTRAVENOUS AS NEEDED
Status: DISCONTINUED | OUTPATIENT
Start: 2022-09-07 | End: 2022-09-07 | Stop reason: SURG

## 2022-09-07 RX ORDER — MEPERIDINE HYDROCHLORIDE 25 MG/ML
12.5 INJECTION INTRAMUSCULAR; INTRAVENOUS; SUBCUTANEOUS
Status: DISCONTINUED | OUTPATIENT
Start: 2022-09-07 | End: 2022-09-07 | Stop reason: HOSPADM

## 2022-09-07 RX ORDER — SODIUM CHLORIDE, SODIUM LACTATE, POTASSIUM CHLORIDE, CALCIUM CHLORIDE 600; 310; 30; 20 MG/100ML; MG/100ML; MG/100ML; MG/100ML
125 INJECTION, SOLUTION INTRAVENOUS ONCE
Status: COMPLETED | OUTPATIENT
Start: 2022-09-07 | End: 2022-09-07

## 2022-09-07 RX ORDER — IPRATROPIUM BROMIDE AND ALBUTEROL SULFATE 2.5; .5 MG/3ML; MG/3ML
3 SOLUTION RESPIRATORY (INHALATION) ONCE AS NEEDED
Status: DISCONTINUED | OUTPATIENT
Start: 2022-09-07 | End: 2022-09-07 | Stop reason: HOSPADM

## 2022-09-07 RX ORDER — SUCCINYLCHOLINE CHLORIDE 20 MG/ML
INJECTION INTRAMUSCULAR; INTRAVENOUS AS NEEDED
Status: DISCONTINUED | OUTPATIENT
Start: 2022-09-07 | End: 2022-09-07 | Stop reason: SURG

## 2022-09-07 RX ORDER — HYDROCODONE BITARTRATE AND ACETAMINOPHEN 7.5; 325 MG/1; MG/1
1 TABLET ORAL EVERY 4 HOURS PRN
Qty: 15 TABLET | Refills: 0 | Status: SHIPPED | OUTPATIENT
Start: 2022-09-07 | End: 2022-11-03

## 2022-09-07 RX ORDER — OXYCODONE HYDROCHLORIDE AND ACETAMINOPHEN 5; 325 MG/1; MG/1
1 TABLET ORAL ONCE AS NEEDED
Status: COMPLETED | OUTPATIENT
Start: 2022-09-07 | End: 2022-09-07

## 2022-09-07 RX ORDER — DEXAMETHASONE SODIUM PHOSPHATE 10 MG/ML
INJECTION INTRAMUSCULAR; INTRAVENOUS AS NEEDED
Status: DISCONTINUED | OUTPATIENT
Start: 2022-09-07 | End: 2022-09-07 | Stop reason: SURG

## 2022-09-07 RX ORDER — SODIUM CHLORIDE, SODIUM LACTATE, POTASSIUM CHLORIDE, CALCIUM CHLORIDE 600; 310; 30; 20 MG/100ML; MG/100ML; MG/100ML; MG/100ML
100 INJECTION, SOLUTION INTRAVENOUS ONCE AS NEEDED
Status: DISCONTINUED | OUTPATIENT
Start: 2022-09-07 | End: 2022-09-07 | Stop reason: HOSPADM

## 2022-09-07 RX ORDER — MIDAZOLAM HYDROCHLORIDE 1 MG/ML
0.5 INJECTION INTRAMUSCULAR; INTRAVENOUS
Status: DISCONTINUED | OUTPATIENT
Start: 2022-09-07 | End: 2022-09-07 | Stop reason: HOSPADM

## 2022-09-07 RX ORDER — LABETALOL HYDROCHLORIDE 5 MG/ML
INJECTION, SOLUTION INTRAVENOUS AS NEEDED
Status: DISCONTINUED | OUTPATIENT
Start: 2022-09-07 | End: 2022-09-07 | Stop reason: SURG

## 2022-09-07 RX ORDER — MAGNESIUM HYDROXIDE 1200 MG/15ML
LIQUID ORAL AS NEEDED
Status: DISCONTINUED | OUTPATIENT
Start: 2022-09-07 | End: 2022-09-07 | Stop reason: HOSPADM

## 2022-09-07 RX ORDER — FENTANYL CITRATE 50 UG/ML
INJECTION, SOLUTION INTRAMUSCULAR; INTRAVENOUS AS NEEDED
Status: DISCONTINUED | OUTPATIENT
Start: 2022-09-07 | End: 2022-09-07 | Stop reason: SURG

## 2022-09-07 RX ORDER — PROPOFOL 10 MG/ML
VIAL (ML) INTRAVENOUS AS NEEDED
Status: DISCONTINUED | OUTPATIENT
Start: 2022-09-07 | End: 2022-09-07 | Stop reason: SURG

## 2022-09-07 RX ORDER — SODIUM CHLORIDE 0.9 % (FLUSH) 0.9 %
10 SYRINGE (ML) INJECTION AS NEEDED
Status: DISCONTINUED | OUTPATIENT
Start: 2022-09-07 | End: 2022-09-07 | Stop reason: HOSPADM

## 2022-09-07 RX ORDER — FENTANYL CITRATE 50 UG/ML
50 INJECTION, SOLUTION INTRAMUSCULAR; INTRAVENOUS
Status: DISCONTINUED | OUTPATIENT
Start: 2022-09-07 | End: 2022-09-07 | Stop reason: HOSPADM

## 2022-09-07 RX ORDER — FAMOTIDINE 10 MG/ML
INJECTION, SOLUTION INTRAVENOUS AS NEEDED
Status: DISCONTINUED | OUTPATIENT
Start: 2022-09-07 | End: 2022-09-07 | Stop reason: SURG

## 2022-09-07 RX ADMIN — SUCCINYLCHOLINE CHLORIDE 100 MG: 20 INJECTION, SOLUTION INTRAMUSCULAR; INTRAVENOUS at 09:54

## 2022-09-07 RX ADMIN — LIDOCAINE HYDROCHLORIDE 60 MG: 20 INJECTION, SOLUTION INFILTRATION; PERINEURAL at 09:54

## 2022-09-07 RX ADMIN — LABETALOL HYDROCHLORIDE 5 MG: 5 INJECTION INTRAVENOUS at 11:48

## 2022-09-07 RX ADMIN — PROPOFOL 120 MG: 10 INJECTION, EMULSION INTRAVENOUS at 09:54

## 2022-09-07 RX ADMIN — FENTANYL CITRATE 50 MCG: 50 INJECTION, SOLUTION INTRAMUSCULAR; INTRAVENOUS at 12:23

## 2022-09-07 RX ADMIN — FAMOTIDINE 20 MG: 10 INJECTION INTRAVENOUS at 09:49

## 2022-09-07 RX ADMIN — SODIUM CHLORIDE, POTASSIUM CHLORIDE, SODIUM LACTATE AND CALCIUM CHLORIDE: 600; 310; 30; 20 INJECTION, SOLUTION INTRAVENOUS at 09:48

## 2022-09-07 RX ADMIN — FENTANYL CITRATE 50 MCG: 50 INJECTION INTRAMUSCULAR; INTRAVENOUS at 09:54

## 2022-09-07 RX ADMIN — FENTANYL CITRATE 50 MCG: 50 INJECTION INTRAMUSCULAR; INTRAVENOUS at 10:13

## 2022-09-07 RX ADMIN — ONDANSETRON 4 MG: 2 INJECTION INTRAMUSCULAR; INTRAVENOUS at 11:08

## 2022-09-07 RX ADMIN — OXYCODONE HYDROCHLORIDE AND ACETAMINOPHEN 1 TABLET: 5; 325 TABLET ORAL at 12:33

## 2022-09-07 RX ADMIN — DEXAMETHASONE SODIUM PHOSPHATE 20 MG: 10 INJECTION INTRAMUSCULAR; INTRAVENOUS at 10:33

## 2022-09-07 NOTE — OP NOTE
Procedure Note    Surgeon: Dr. 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  High parathyroid levels have been noted.  Sestamibi and ultrasounds have localize the area to right lower thyroid 1.6 x .6 cm       General endotracheal anesthesia    Procedure Details: Nerve integrity monitor endotracheal tube was used intubation.  Patient was prepped and draped sterile fashion anterior neck.  2 inch incision was made in a skin crease overlying the cricoid.  Sharp dissection used through the skin and blunt and sharp dissection used and get to the surface of the thyroid gland.  The inferior parathyroid gland was identified and looked normal.  With further dissection and care to stimulate for and protect the recurrent nerve showed a almost 1-1/2 cm mass consistent with adenoma.  This was removed and sent to pathology which confirmed probable parathyroid adenoma and then 20 minutes post excision, PTH was sampled and was 20.7 where it had been 74 at the beginning.  Nerve was identified and stimulated intact.  Hemostasis throughout the case was obtained by the unipolar cautery or bipolar cautery.  Wound irrigated with saline.  Cam powder was used and the wound was closed in layers with 3-0 Vicryl and 4-0 Monocryl and skin with pressure dressing applied.  Patient woken up taken to recovery room uneventfully.  Voice was excellent.    Findings: Parathyroid dropped from preoperative 74-20.7 and pathology felt that there was a hypercellular parathyroid tissue consistent with adenoma    Estimated Blood Loss:  minimal           Drains: 0           Specimens:   ID Type Source Tests Collected by Time   A (Not marked as sent) : Parathyroid Tissue Parathyroid Gland TISSUE EXAM, [facility] ([facility]) Moi Garrett MD 9/7/2022 [id number]               Implants:   Implant Name Type Inv. Item Serial No.  Lot No. LRB No. Used Action   SEAL HEMO SURG CAS/AH ABS/PWDR 3GM - [id number] Implant SEAL HEMO SURG CAS/AH ABS/PWDR 3GM  [employer] Gift Card Combo [id number] N/A 1 Implanted              Complications: 0           Disposition: PACU - hemodynamically stable.           Condition: stable

## 2022-09-07 NOTE — ANESTHESIA POSTPROCEDURE EVALUATION
Admission Date: 11/20/2017  Discharge Date: 11/21/2017    DISCHARGE DIAGNOSES:  AXIS I:  Paranoid schizophrenia.  Mood disorder, not otherwise specified.  Low IQ.  Developmental disability.    HISTORY OF PRESENT ILLNESS:  Please review admission note dictated by Dr. Reyes dated 11/20/2017 for detailed history of present illness.  In summary, the patient is a 25-year-old male admitted on a [facility] for assessment of suicidal thoughts, status post suicide attempt and thought process and paranoia.  The patient denied having any suicidal thoughts upon admission, was able to contract for safety.    Please review admission note for detailed history of present illness, past psychiatric history, medical history, social history, family history, AODA history, medications at time of admission and mental status examination.    HOSPITAL COURSE:  The patient was admitted to Inpatient Psychiatry on [facility].  The patient agreed for a 60 day stipulation from [facility] and stipulations were granted.  I spoke with the patient's aunt several times while the patient was in the hospital.  Family session scheduled, but on 11/21/2017, patient wanted to be discharged, feeling safe.  Denies having any suicidal or homicidal thought.  Denies any psychotic symptoms but continues to have some paranoia.  I spoke with his aunt about disposition and discharge planning.  Apparently, patient's uncle agreed to take him back, and  is involved in discharge planning.  The patient given Risperdal Consta injection 25 mg on 11/21/2017.  He is going to get Risperdal Consta injection every 2 weeks and continue his nighttime Seroquel and Minipress.      On the day of discharge, 11/21/2017, patient was pleasant and cooperative with treatment team.  Denies having any suicidal or homicidal thoughts.  Able to contract for safety.    Initially discharged was planned on 11/22/2017, but on 11/21/2017 patient quite got  Patient: Diana Cassidy    Procedure Summary     Date: 09/07/22 Room / Location:  [facility] OR 09 /  [facility] OR    Anesthesia Start: 0948 Anesthesia Stop: 1201    Procedure: PARATHYROIDECTOMY FROZEN SECTION INTROP PTH (N/A Neck) Diagnosis:       Hyperparathyroidism (HCC)      (Hyperparathyroidism (HCC) [E21.3])      (FROZEN SECTION INTROP PTH)    Surgeons: Moi Garrett MD Provider: Eloy Olmstead MD    Anesthesia Type: general ASA Status: 3          Anesthesia Type: general    Vitals  Vitals Value Taken Time   /69 09/07/22 1233   Temp 97.6 °F (36.4 °C) 09/07/22 1203   Pulse 74 09/07/22 1233   Resp 16 09/07/22 1233   SpO2 96 % 09/07/22 1233           Post Anesthesia Care and Evaluation    Patient location during evaluation: PACU  Patient participation: complete - patient participated  Level of consciousness: awake  Pain score: 2  Pain management: adequate    Airway patency: patent  Anesthetic complications: No anesthetic complications  PONV Status: none  Cardiovascular status: acceptable  Respiratory status: acceptable  Hydration status: acceptable       upset thinking that he is not going to be discharged.  He went to his room and barricaded himself in his room, but after talking to uncle, once disposition was planned, the patient became more cooperative.  The patient has a very limited comprehension about his issues.  He tends to be very impulsive because of his developmental disability and also schizophrenia.    DISCHARGE MEDICATIONS:  Clonazepam 1 mg nightly as needed for anxiety.  Hydroxyzine 25 mg 3 times as needed for anxiety.  Seroquel 300 mg at bedtime.  Claritin 10 mg daily.  Risperdal Consta 25 mg every 2 weeks.    FOLLOW-UP APPOINTMENTS:  The patient will see a therapist, and family care  will be making these appointments for the patient and also will be having a psychiatrist outpatient, and family care  will provide these resources for the patient.  They will have in-home RN administering his Risperdal Consta injection every 2 weeks.  His family care  is Ania at [phone] and family care nurse is Radha at [phone].      Total time spent in discharge is more than 30 minutes.      Dictated By: Mis Reyes MD  Signing Provider: Mis Reyes MD    /[facility] ([id number])  DD: 11/22/2017 15:03:31 TD: 11/22/2017 16:14:30    Copy Sent To:

## 2022-09-07 NOTE — ANESTHESIA PREPROCEDURE EVALUATION
Anesthesia Evaluation     Patient summary reviewed and Nursing notes reviewed   no history of anesthetic complications:  NPO Solid Status: > 8 hours  NPO Liquid Status: > 8 hours           Airway   Mallampati: II  TM distance: >3 FB  Neck ROM: full  No difficulty expected  Dental - normal exam   (+) poor dentition    Pulmonary - normal exam   (+) a smoker Former,   Cardiovascular - normal exam    ECG reviewed    (+) hypertension, hyperlipidemia,     ROS comment: 9/6/22 EKG    Normal sinus rhythm  Normal ECG  No previous ECGs available  Confirmed by Tammy Wells ([id number]) on 9/6/2022 3:05:33 PM    Neuro/Psych- negative ROS  GI/Hepatic/Renal/Endo    (+) obesity,  GERD,  liver disease fatty liver disease, diabetes mellitus type 2 poorly controlled using insulin, thyroid problem hypothyroidism    ROS Comment: Hyperparathyroidism    Musculoskeletal (-) negative ROS    Abdominal  - normal exam    Bowel sounds: normal.   Substance History - negative use  (-) alcohol use, drug use     OB/GYN negative ob/gyn ROS         Other - negative ROS                     Anesthesia Plan    ASA 3     general     intravenous induction     Anesthetic plan, risks, benefits, and alternatives have been provided, discussed and informed consent has been obtained with: patient.        CODE STATUS:      FULL      Lab Results   Component Value Date    WBC 6.16 09/06/2022    HGB 13.5 09/06/2022    HCT 41.4 09/06/2022    MCV 93.5 09/06/2022     09/06/2022     Lab Results   Component Value Date    GLUCOSE 168 (H) 09/06/2022    BUN 15 09/06/2022    CREATININE 0.68 09/06/2022    BCR 22.1 09/06/2022    K 4.3 09/06/2022    CO2 23.7 09/06/2022    CALCIUM 12.2 (H) 09/06/2022    PROTENTOTREF 6.9 10/05/2020    ALBUMIN 4.44 08/03/2022    LABIL2 1.3 10/05/2020    AST 35 (H) 08/03/2022    ALT 23 08/03/2022

## 2022-09-07 NOTE — ANESTHESIA PROCEDURE NOTES
Airway  Urgency: elective    Date/Time: 9/7/2022 9:55 AM  Airway not difficult    General Information and Staff    Patient location during procedure: OR  CRNA/CAA: Susu Ríos CRNA    Indications and Patient Condition  Indications for airway management: airway protection    Preoxygenated: yes  MILS maintained throughout  Mask difficulty assessment: 1 - vent by mask    Final Airway Details  Final airway type: endotracheal airway      Successful airway: ETT and NIM tube  Cuffed: yes   Successful intubation technique: direct laryngoscopy  Endotracheal tube insertion site: oral  Blade: Susan  Blade size: 3  ETT size (mm): 7.0  Cormack-Lehane Classification: grade I - full view of glottis  Placement verified by: chest auscultation   Number of attempts at approach: 1  Assessment: lips, teeth, and gum same as pre-op and atraumatic intubation

## 2022-09-09 LAB — REF LAB TEST METHOD: NORMAL

## 2022-09-13 ENCOUNTER — LAB (OUTPATIENT)
Dept: LAB | Facility: HOSPITAL | Age: 67
End: 2022-09-13

## 2022-09-13 ENCOUNTER — TRANSCRIBE ORDERS (OUTPATIENT)
Dept: ADMINISTRATIVE | Facility: HOSPITAL | Age: 67
End: 2022-09-13

## 2022-09-13 DIAGNOSIS — E21.4 OTHER SPECIFIED DISORDERS OF PARATHYROID GLAND: ICD-10-CM

## 2022-09-13 DIAGNOSIS — E21.4 OTHER SPECIFIED DISORDERS OF PARATHYROID GLAND: Primary | ICD-10-CM

## 2022-09-13 PROCEDURE — 82330 ASSAY OF CALCIUM: CPT

## 2022-09-13 PROCEDURE — 82306 VITAMIN D 25 HYDROXY: CPT

## 2022-09-13 PROCEDURE — 83970 ASSAY OF PARATHORMONE: CPT

## 2022-09-13 PROCEDURE — 36415 COLL VENOUS BLD VENIPUNCTURE: CPT

## 2022-09-14 LAB
CA-I BLD-MCNC: 5 MG/DL (ref 4.6–5.4)
CA-I SERPL ISE-MCNC: 1.25 MMOL/L (ref 1.15–1.35)
PTH-INTACT SERPL-MCNC: 27.3 PG/ML (ref 15–65)

## 2022-09-21 LAB
25(OH)D2 SERPL-MCNC: <1 NG/ML
25(OH)D3 SERPL-MCNC: 46 NG/ML
25(OH)D3+25(OH)D2 SERPL-MCNC: 47 NG/ML

## 2022-10-06 ENCOUNTER — SPECIALTY PHARMACY (OUTPATIENT)
Dept: PHARMACY | Facility: HOSPITAL | Age: 67
End: 2022-10-06

## 2022-11-01 ENCOUNTER — SPECIALTY PHARMACY (OUTPATIENT)
Dept: PHARMACY | Facility: HOSPITAL | Age: 67
End: 2022-11-01

## 2022-11-02 RX ORDER — PEN NEEDLE, DIABETIC 30 GX3/16"
1 NEEDLE, DISPOSABLE MISCELLANEOUS DAILY
Qty: 100 EACH | Refills: 1 | Status: SHIPPED | OUTPATIENT
Start: 2022-11-02 | End: 2023-02-06 | Stop reason: SDUPTHER

## 2022-11-03 ENCOUNTER — OFFICE VISIT (OUTPATIENT)
Dept: ENDOCRINOLOGY | Facility: CLINIC | Age: 67
End: 2022-11-03

## 2022-11-03 ENCOUNTER — SPECIALTY PHARMACY (OUTPATIENT)
Dept: PHARMACY | Facility: HOSPITAL | Age: 67
End: 2022-11-03

## 2022-11-03 ENCOUNTER — LAB (OUTPATIENT)
Dept: LAB | Facility: HOSPITAL | Age: 67
End: 2022-11-03

## 2022-11-03 VITALS
OXYGEN SATURATION: 97 % | HEART RATE: 82 BPM | HEIGHT: 63 IN | BODY MASS INDEX: 32.57 KG/M2 | DIASTOLIC BLOOD PRESSURE: 66 MMHG | SYSTOLIC BLOOD PRESSURE: 142 MMHG | WEIGHT: 183.8 LBS

## 2022-11-03 DIAGNOSIS — E21.3 HYPERPARATHYROIDISM: ICD-10-CM

## 2022-11-03 DIAGNOSIS — E11.65 TYPE 2 DIABETES MELLITUS WITH HYPERGLYCEMIA, WITH LONG-TERM CURRENT USE OF INSULIN: Primary | ICD-10-CM

## 2022-11-03 DIAGNOSIS — Z79.4 TYPE 2 DIABETES MELLITUS WITH HYPERGLYCEMIA, WITH LONG-TERM CURRENT USE OF INSULIN: Primary | ICD-10-CM

## 2022-11-03 LAB
ALBUMIN SERPL-MCNC: 4.67 G/DL (ref 3.5–5.2)
ALBUMIN/GLOB SERPL: 1.6 G/DL
ALP SERPL-CCNC: 68 U/L (ref 39–117)
ALT SERPL W P-5'-P-CCNC: 20 U/L (ref 1–33)
ANION GAP SERPL CALCULATED.3IONS-SCNC: 14.6 MMOL/L (ref 5–15)
AST SERPL-CCNC: 34 U/L (ref 1–32)
BILIRUB SERPL-MCNC: 0.4 MG/DL (ref 0–1.2)
BUN SERPL-MCNC: 15 MG/DL (ref 8–23)
BUN/CREAT SERPL: 19.5 (ref 7–25)
CALCIUM SPEC-SCNC: 10.2 MG/DL (ref 8.6–10.5)
CHLORIDE SERPL-SCNC: 100 MMOL/L (ref 98–107)
CO2 SERPL-SCNC: 24.4 MMOL/L (ref 22–29)
CREAT SERPL-MCNC: 0.77 MG/DL (ref 0.57–1)
EGFRCR SERPLBLD CKD-EPI 2021: 83.6 ML/MIN/1.73
EXPIRATION DATE: NORMAL
GLOBULIN UR ELPH-MCNC: 2.9 GM/DL
GLUCOSE BLDC GLUCOMTR-MCNC: 185 MG/DL (ref 70–130)
GLUCOSE SERPL-MCNC: 193 MG/DL (ref 65–99)
HBA1C MFR BLD: 9.1 %
Lab: NORMAL
POTASSIUM SERPL-SCNC: 4.2 MMOL/L (ref 3.5–5.2)
PROT SERPL-MCNC: 7.6 G/DL (ref 6–8.5)
SODIUM SERPL-SCNC: 139 MMOL/L (ref 136–145)

## 2022-11-03 PROCEDURE — 99214 OFFICE O/P EST MOD 30 MIN: CPT | Performed by: NURSE PRACTITIONER

## 2022-11-03 PROCEDURE — 36415 COLL VENOUS BLD VENIPUNCTURE: CPT | Performed by: NURSE PRACTITIONER

## 2022-11-03 PROCEDURE — 83036 HEMOGLOBIN GLYCOSYLATED A1C: CPT | Performed by: NURSE PRACTITIONER

## 2022-11-03 PROCEDURE — 80053 COMPREHEN METABOLIC PANEL: CPT | Performed by: NURSE PRACTITIONER

## 2022-11-03 PROCEDURE — 82962 GLUCOSE BLOOD TEST: CPT | Performed by: NURSE PRACTITIONER

## 2022-11-03 PROCEDURE — 3046F HEMOGLOBIN A1C LEVEL >9.0%: CPT | Performed by: NURSE PRACTITIONER

## 2022-11-03 PROCEDURE — 83970 ASSAY OF PARATHORMONE: CPT | Performed by: NURSE PRACTITIONER

## 2022-11-03 RX ORDER — INSULIN DEGLUDEC INJECTION 100 U/ML
25 INJECTION, SOLUTION SUBCUTANEOUS NIGHTLY
Qty: 15 ML | Refills: 2 | Status: SHIPPED | OUTPATIENT
Start: 2022-11-03 | End: 2023-02-06 | Stop reason: SDUPTHER

## 2022-11-03 RX ORDER — LINAGLIPTIN 5 MG/1
5 TABLET, FILM COATED ORAL DAILY
Qty: 90 TABLET | Refills: 1 | Status: SHIPPED | OUTPATIENT
Start: 2022-11-03 | End: 2023-02-06 | Stop reason: SDUPTHER

## 2022-11-03 RX ORDER — DAPAGLIFLOZIN 10 MG/1
10 TABLET, FILM COATED ORAL DAILY
Qty: 90 TABLET | Refills: 1 | Status: SHIPPED | OUTPATIENT
Start: 2022-11-03 | End: 2023-02-06 | Stop reason: SDUPTHER

## 2022-11-03 NOTE — PATIENT INSTRUCTIONS
Metformin 1,000  Mg twice a day,   Tradjenta 5 mg daily,   Tresiba 25 units nightly,   Farxiga 10 mg daily

## 2022-11-03 NOTE — PROGRESS NOTES
Specialty Pharmacy Patient Management Program  Endocrinology Reassessment     Diana Cassidy is a 67 y.o. female with Type 2 Diabetes seen by an Endocrinology provider and enrolled in the Endocrinology Patient Management program offered by [facility].  A follow-up outreach was conducted, including assessment of continued therapy appropriateness, medication adherence, and side effect incidence and management for Tradjenta, Metformin, Farxiga and Tresiba.     Patient is currently taking Metformin 1,000 mg twice daily, Tradjenta 5 mg daily, Farxiga 5 mg daily and Tresiba 22 units at night. She reports tolerating these medications well with no side effects. She checks her BG once daily. She denies low BG <70.      Patient denies personal/family history of thyroid cancer, denies issues with pancreas/pancreatitis, and denies issues with recurrent UTI/yeast infections. She hasn't tried any other medications in the past for diabetes. In the past she was misdiagnosed with cirrhosis of the liver. Patient states that the specialist she sees now informed her of the misdiagnosis. He routinely follows her labs.     Changes to Insurance Coverage or Financial Support  None    Relevant Past Medical History and Comorbidities  Relevant medical history and concomitant health conditions were discussed with the patient. The patient's chart has been reviewed for relevant past medical history and comorbid health conditions and updated as necessary.   Past Medical History:   Diagnosis Date   • Diabetes mellitus (HCC)    • Disease of thyroid gland    • Elevated cholesterol    • Fatty liver disease, nonalcoholic     STATES SHE QUIT DRINKING   • GERD (gastroesophageal reflux disease)    • Hyperlipidemia NA    Don't remember   • Hypertension 2007   • Serum calcium elevated    • Vitamin D deficiency 2020     Social History     Socioeconomic History   • Marital status:    Tobacco Use   • Smoking status: Former      Packs/day: 1.50     Years: 30.00     Pack years: 45.00     Types: Cigarettes     Quit date: 5/9/2012     Years since quitting: 10.4   • Smokeless tobacco: Never   Vaping Use   • Vaping Use: Never used   Substance and Sexual Activity   • Alcohol use: Not Currently     Comment: QUIT 2011---DRANK TOO MUCH 2X A WEEK   • Drug use: Never   • Sexual activity: Not Currently     Birth control/protection: Surgical       Problem list reviewed by Amira Nuñez RPH on 11/3/2022 at  2:32 PM    Allergies  Known allergies and reactions were discussed with the patient. The patient's chart has been reviewed for allergy information and updated as necessary.   Patient has no known allergies.    Allergies reviewed by Amira Nuñez RPH on 11/3/2022 at  2:29 PM    Relevant Laboratory Values  A1C Last 3 Results    HGBA1C Last 3 Results 8/3/22 11/3/22   Hemoglobin A1C 8.4 9.1           Lab Results   Component Value Date    HGBA1C 9.1 11/03/2022     Lab Results   Component Value Date    GLUCOSE 168 (H) 09/06/2022    CALCIUM 12.2 (H) 09/06/2022     09/06/2022    K 4.3 09/06/2022    CO2 23.7 09/06/2022     09/06/2022    BUN 15 09/06/2022    CREATININE 0.68 09/06/2022    BCR 22.1 09/06/2022    ANIONGAP 12.3 09/06/2022     No results found for: CHOL, CHLPL, TRIG, HDL, LDL, LDLDIRECT      Current Medication List  This medication list has been reviewed with the patient and evaluated for any interactions or necessary modifications/recommendations, and updated to include all prescription medications, OTC medications, and supplements the patient is currently taking.  This list reflects what is contained in the patient's profile, which has also been marked as reviewed to communicate to other providers it is the most up to date version of the patient's current medication therapy.     Current Outpatient Medications:   •  Calcium Carbonate (CALCIUM 600 PO), Take 1 tablet by mouth Daily., Disp: , Rfl:   •  Cholecalciferol (Vitamin D) 50 MCG  (2000 UT) tablet, Take 2,000 Units by mouth 1 (One) Time Per Week., Disp: , Rfl:   •  citalopram (CeleXA) 40 MG tablet, Take 1 tablet by mouth Daily., Disp: , Rfl:   •  dapagliflozin (Farxiga) 5 MG tablet tablet, Take 1 tablet by mouth Daily., Disp: 90 tablet, Rfl: 1  •  ezetimibe (ZETIA) 10 MG tablet, Take 1 tablet by mouth Daily., Disp: , Rfl:   •  felodipine (PLENDIL) 5 MG 24 hr tablet, , Disp: , Rfl:   •  furosemide (LASIX) 40 MG tablet, Take 1 tablet by mouth Daily., Disp: , Rfl:   •  Insulin Pen Needle (Pen Needles) 32G X 4 MM misc, Use to inject insulin once daily as directed., Disp: 100 each, Rfl: 1  •  metFORMIN (GLUCOPHAGE) 1000 MG tablet, Take 1 tablet by mouth 2 (Two) Times a Day With Meals., Disp: 180 tablet, Rfl: 1  •  metoprolol tartrate (LOPRESSOR) 25 MG tablet, Take 25 mg by mouth 2 (Two) Times a Day., Disp: , Rfl:   •  Tradjenta 5 MG tablet tablet, Take 1 tablet by mouth Daily., Disp: 90 tablet, Rfl: 1  •  Tresiba FlexTouch 100 UNIT/ML solution pen-injector injection, Inject 18 Units under the skin into the appropriate area as directed Every Night. (Patient taking differently: Inject 22 Units under the skin into the appropriate area as directed Every Night.), Disp: 15 mL, Rfl: 1    Medicines reviewed by Amira Nuñez, [facility] on 11/3/2022 at  2:32 PM    Drug Interactions  None relevant to diabetes care     Recommended Medications Assessment  • Aspirin - Indicated, not currently taking  • Statin - Indicated, not currently taking (on Zetia)  • ACEi/ARB - Indicated, not currently taking     Current 10-Year ASCVD Risk: cannot calculate - need updated labs.      Adverse Drug Reactions  • Adverse Reactions Experienced: None  • Plan for ADR Management: Not Required    Hospitalizations and Urgent Care Since Last Assessment  • Hospitalizations or Admissions: None  • ED Visits: None  • Urgent Office Visits: None    Adherence and Self-Administration  Adherence related patient's specialty therapy was discussed  with the patient. The Adherence segment of this outreach has been reviewed and updated.     • Ongoing or New Barriers to Patient Adherence and/or Self-Administration: None  • Methods for Supporting Patient Adherence and/or Self-Administration: None required    Goals of Therapy  Goals related to the patient's specialty therapy was discussed with the patient. The Patient Goals segment of this outreach has been reviewed and updated.    Goals     • Consistently take medications as prescribed     • HEMOGLOBIN A1C < 7            Quality of Life Assessment   Quality of Life related to the patient's specialty therapy was discussed with the patient. The QOL segment of this outreach has been reviewed and updated.      No change    Reassessment Plan & Follow-Up  1. Patient's diabetes has worsened with A1C up to 9.1% from 8.4%. Recommended increase dose of Farxiga as she is tolerating current dose well with no side effects.    2. Medication Therapy Changes:   · Increase Farxiga to 10 mg daily   · Increase Tresiba to 25 units at night  3. Additional Plans, Therapy Recommendations, or Therapy Problems to Be Addressed: Updated lipid panel would be beneficial to determine appropriate lipid lowering therapy for patient.  4. Pharmacist to perform regular reassessments no more than (6) months from the previous assessment.  5. Care Coordinator to set up future refill outreaches, coordinate prescription delivery, and escalate clinical questions to pharmacist.     Attestation  I attest that the specialty medication(s) addressed above are appropriate for the patient based on my reassessment.  If the prescribed therapy is at any point deemed not appropriate based on the current or future assessments, a consultation will be initiated with the patient's specialty care provider to determine the best course of action. The revised plan of therapy will be documented along with any additional patient education provided.     Amira Nuñez,  RPH  11/3/2022  14:41 EDT

## 2022-11-03 NOTE — ASSESSMENT & PLAN NOTE
-Diabetes is worsening with A1c up from 8.4 to 9.1.  -Discussed dietary and exercise guidelines.   -Discussed importance of yearly eye exams.  -Discussed importance of checking BG's regularly.  -Continue Metformin 1,000 mg BID.  -Continue Tradjenta 5 mg QD.  -Increase Tresiba 25 units qhs.  -Increase Farxiga 10 mg QD. Discussed the medication's MOA and that it may cause more frequent urination particularly upon starting the medication. Take one tablet in the morning with or without food. Encouraged to drink plenty of water as to not get dehydrated especially in warmer weather or with activity. Also discussed there may be an increased incidence of UTIs or yeast infections and to monitor for signs/symptoms.  -S/S hypoglycemia reviewed with Rule of 15's advised.  -Follow-up in 3 months.

## 2022-11-03 NOTE — PROGRESS NOTES
Chief Complaint   Patient presents with   • Diabetes     F/u        Diana Cassidy is a 67 y.o. female had concerns including Diabetes (F/u).    T2DM and Hyperparathyroidism.    T2DM:  Diabetes was diagnosed .  Complications include none.  Last ophtho exam was , Dr. Morillo.  Current medications for diabetes include Metformin 1,000  Mg BID, Tradjenta 5 mg QD, Tresiba 22 units qhs, Farxiga 5 mg QD.  She checks her blood sugar one times per day.   Hypos: none  FSB+  PPD: 200+    Diet: She eats what she wants, she is not limiting much.    She has a parathyroidectomy in September by Dr. Garrett.    Her most recent labs are as follows:  2022  24 urine calcium: 465.8 H (100-300)  PTH: 74.3 H (15-65)  2022  Calcium: 12.5 H (8.7-10.3)  Vit D: 39.6 ()  2021: DEXA scan resulted osteoporotic with high fracture risk.  She has not had an ionized calcium.     Patient reports that diagnosis of hyperparathyroidism was made   Patient is not taking calcium supplements or OTC medications containing calcium, such as TUMS.   Patient is taking vitamin D supplementation.    Patient has history of hypertension treated with HCTZ in the past, not in current medication regimen.  Patient has no history of kidney dysfunction.  Patient has no history of kidney stones.  Patient has history of osteoporosis.  Patient has no family history of calcium disorders.  Patient reports <1 servings per day of dairy.     Past Medical History:   Diagnosis Date   • Diabetes mellitus (HCC)    • Disease of thyroid gland    • Elevated cholesterol    • Fatty liver disease, nonalcoholic     STATES SHE QUIT DRINKING   • GERD (gastroesophageal reflux disease)    • Hyperlipidemia NA    Don't remember   • Hypertension    • Serum calcium elevated    • Vitamin D deficiency      Past Surgical History:   Procedure Laterality Date   • COLONOSCOPY     • ENDOSCOPY     • GALLBLADDER SURGERY     • PARATHYROIDECTOMY N/A 2022     Procedure: PARATHYROIDECTOMY FROZEN SECTION INTROP PTH;  Surgeon: Moi Garrett MD;  Location: [facility];  Service: ENT;  Laterality: N/A;   • TUBAL ABDOMINAL LIGATION        Family History   Problem Relation Age of Onset   • Cancer Mother         Lung cancer   • Cancer Father         Pancreatic/ Lung Cancer   • Cancer Sister         Lymphoma/bladder   • Cancer Sister         Bladder Cancer   • Breast cancer Neg Hx       Social History     Socioeconomic History   • Marital status:    Tobacco Use   • Smoking status: Former     Packs/day: 1.50     Years: 30.00     Pack years: 45.00     Types: Cigarettes     Quit date: 5/9/2012     Years since quitting: 10.4   • Smokeless tobacco: Never   Vaping Use   • Vaping Use: Never used   Substance and Sexual Activity   • Alcohol use: Not Currently     Comment: QUIT 2011---DRANK TOO MUCH 2X A WEEK   • Drug use: Never   • Sexual activity: Not Currently     Birth control/protection: Surgical      No Known Allergies   Current Outpatient Medications on File Prior to Visit   Medication Sig Dispense Refill   • Cholecalciferol (Vitamin D) 50 MCG (2000 UT) tablet Take 2,000 Units by mouth 1 (One) Time Per Week.     • citalopram (CeleXA) 40 MG tablet Take 1 tablet by mouth Daily.     • dapagliflozin (Farxiga) 5 MG tablet tablet Take 1 tablet by mouth Daily. 90 tablet 1   • ezetimibe (ZETIA) 10 MG tablet Take 1 tablet by mouth Daily.     • felodipine (PLENDIL) 5 MG 24 hr tablet      • furosemide (LASIX) 40 MG tablet Take 1 tablet by mouth Daily.     • Insulin Pen Needle (Pen Needles) 32G X 4 MM misc Use to inject insulin once daily as directed. 100 each 1   • metFORMIN (GLUCOPHAGE) 1000 MG tablet Take 1 tablet by mouth 2 (Two) Times a Day With Meals. 180 tablet 1   • metoprolol tartrate (LOPRESSOR) 25 MG tablet Take 25 mg by mouth 2 (Two) Times a Day.     • Tradjenta 5 MG tablet tablet Take 1 tablet by mouth Daily. 90 tablet 1   • Tresiba FlexTouch 100 UNIT/ML solution  "pen-injector injection Inject 18 Units under the skin into the appropriate area as directed Every Night. (Patient taking differently: Inject 22 Units under the skin into the appropriate area as directed Every Night.) 15 mL 1   • [DISCONTINUED] HYDROcodone-acetaminophen (NORCO) 7.5-325 MG per tablet Take 1 tablet by mouth Every 4 (Four) Hours As Needed for Moderate Pain 15 tablet 0     No current facility-administered medications on file prior to visit.        The following portions of the patient's history were reviewed and updated as appropriate: allergies, current medications, past family history, past medical history, past social history, past surgical history and problem list.    Review of Systems   Constitutional: Positive for fatigue. Negative for activity change, appetite change, chills, diaphoresis, fever, unexpected weight gain and unexpected weight loss.   Eyes: Positive for blurred vision and visual disturbance.   Cardiovascular: Negative for palpitations.   Gastrointestinal: Positive for diarrhea.   Endocrine: Negative for cold intolerance, heat intolerance, polydipsia, polyphagia and polyuria.   Musculoskeletal: Negative.    Skin: Positive for dry skin.   Neurological: Negative for dizziness, weakness, light-headedness, numbness, headache, memory problem and confusion.   Hematological: Bruises/bleeds easily.   Psychiatric/Behavioral: Negative for agitation and sleep disturbance. The patient is not nervous/anxious.    All other systems reviewed and are negative.     /66 (BP Location: Left arm, Patient Position: Sitting, Cuff Size: Adult)   Pulse 82   Ht 160 cm (63\")   Wt 83.4 kg (183 lb 12.8 oz)   LMP  (LMP Unknown)   SpO2 97%   BMI 32.56 kg/m²      Physical Exam  Vitals reviewed.   Constitutional:       Appearance: Normal appearance.   Eyes:      Extraocular Movements: Extraocular movements intact.   Cardiovascular:      Rate and Rhythm: Normal rate.   Pulmonary:      Effort: Pulmonary " effort is normal.   Skin:     General: Skin is warm.   Neurological:      Mental Status: She is alert and oriented to person, place, and time.   Psychiatric:         Mood and Affect: Mood normal.         Behavior: Behavior normal.         Thought Content: Thought content normal.         Judgment: Judgment normal.        Labs/Imaging  CMP:  Lab Results   Component Value Date    BUN 15 09/06/2022    CREATININE 0.68 09/06/2022    BCR 22.1 09/06/2022     09/06/2022    K 4.3 09/06/2022    CO2 23.7 09/06/2022    CALCIUM 12.2 (H) 09/06/2022    PROTENTOTREF 6.9 10/05/2020    ALBUMIN 4.44 08/03/2022    LABGLOBREF 3.0 10/05/2020    LABIL2 1.3 10/05/2020    BILITOT 0.2 08/03/2022    ALKPHOS 70 08/03/2022    AST 35 (H) 08/03/2022    ALT 23 08/03/2022     Lipid Panel:  No results found for: CHOL, TRIG, HDL, VLDL, LDL  HbA1c:  Lab Results   Component Value Date    HGBA1C 9.1 11/03/2022    HGBA1C 8.4 08/03/2022     Glucose:  Lab Results   Component Value Date    POCGLU 185 (A) 11/03/2022     TSH:  Lab Results   Component Value Date    TSH 2.400 01/19/2022       Assessment and Plan    Diagnoses and all orders for this visit:    1. Type 2 diabetes mellitus with hyperglycemia, with long-term current use of insulin (HCC) (Primary)  Assessment & Plan:  -Diabetes is worsening with A1c up from 8.4 to 9.1.  -Discussed dietary and exercise guidelines.   -Discussed importance of yearly eye exams.  -Discussed importance of checking BG's regularly.  -Continue Metformin 1,000 mg BID.  -Continue Tradjenta 5 mg QD.  -Increase Tresiba 25 units qhs.  -Increase Farxiga 10 mg QD. Discussed the medication's MOA and that it may cause more frequent urination particularly upon starting the medication. Take one tablet in the morning with or without food. Encouraged to drink plenty of water as to not get dehydrated especially in warmer weather or with activity. Also discussed there may be an increased incidence of UTIs or yeast infections and to  monitor for signs/symptoms.  -S/S hypoglycemia reviewed with Rule of 15's advised.  -Follow-up in 3 months.    Orders:  -     POC Glucose Fingerstick  -     POC Glycosylated Hemoglobin (Hb A1C)    2. Hyperparathyroidism (HCC)  -     PTH, Intact  -     Comprehensive Metabolic Panel       Return in about 3 months (around 2/3/2023) for Follow-up appointment, A1C. The patient was instructed to contact the clinic with any interval questions or concerns.    This document has been electronically signed by FIDELINA Marks  November 3, 2022 14:38 EDT  Endocrinology

## 2022-11-04 LAB — PTH-INTACT SERPL-MCNC: 42 PG/ML (ref 15–65)

## 2022-11-29 ENCOUNTER — SPECIALTY PHARMACY (OUTPATIENT)
Dept: PHARMACY | Facility: HOSPITAL | Age: 67
End: 2022-11-29

## 2022-12-14 ENCOUNTER — SPECIALTY PHARMACY (OUTPATIENT)
Dept: PHARMACY | Facility: HOSPITAL | Age: 67
End: 2022-12-14

## 2023-02-06 ENCOUNTER — OFFICE VISIT (OUTPATIENT)
Dept: ENDOCRINOLOGY | Facility: CLINIC | Age: 68
End: 2023-02-06
Payer: MEDICARE

## 2023-02-06 ENCOUNTER — SPECIALTY PHARMACY (OUTPATIENT)
Dept: PHARMACY | Facility: HOSPITAL | Age: 68
End: 2023-02-06
Payer: MEDICARE

## 2023-02-06 VITALS
SYSTOLIC BLOOD PRESSURE: 132 MMHG | WEIGHT: 180.3 LBS | HEART RATE: 74 BPM | OXYGEN SATURATION: 96 % | BODY MASS INDEX: 31.95 KG/M2 | HEIGHT: 63 IN | DIASTOLIC BLOOD PRESSURE: 70 MMHG

## 2023-02-06 DIAGNOSIS — E11.65 TYPE 2 DIABETES MELLITUS WITH HYPERGLYCEMIA, WITH LONG-TERM CURRENT USE OF INSULIN: Primary | ICD-10-CM

## 2023-02-06 DIAGNOSIS — Z79.4 TYPE 2 DIABETES MELLITUS WITH HYPERGLYCEMIA, WITH LONG-TERM CURRENT USE OF INSULIN: Primary | ICD-10-CM

## 2023-02-06 LAB
EXPIRATION DATE: NORMAL
GLUCOSE BLDC GLUCOMTR-MCNC: 156 MG/DL (ref 70–130)
HBA1C MFR BLD: 8.1 %
Lab: NORMAL

## 2023-02-06 PROCEDURE — 99213 OFFICE O/P EST LOW 20 MIN: CPT | Performed by: NURSE PRACTITIONER

## 2023-02-06 PROCEDURE — 82962 GLUCOSE BLOOD TEST: CPT | Performed by: NURSE PRACTITIONER

## 2023-02-06 PROCEDURE — 83036 HEMOGLOBIN GLYCOSYLATED A1C: CPT | Performed by: NURSE PRACTITIONER

## 2023-02-06 PROCEDURE — 3052F HG A1C>EQUAL 8.0%<EQUAL 9.0%: CPT | Performed by: NURSE PRACTITIONER

## 2023-02-06 RX ORDER — INSULIN DEGLUDEC INJECTION 100 U/ML
25 INJECTION, SOLUTION SUBCUTANEOUS NIGHTLY
Qty: 15 ML | Refills: 2 | Status: SHIPPED | OUTPATIENT
Start: 2023-02-06

## 2023-02-06 RX ORDER — LINAGLIPTIN 5 MG/1
5 TABLET, FILM COATED ORAL DAILY
Qty: 90 TABLET | Refills: 1 | Status: SHIPPED | OUTPATIENT
Start: 2023-02-06

## 2023-02-06 RX ORDER — DAPAGLIFLOZIN 10 MG/1
10 TABLET, FILM COATED ORAL DAILY
Qty: 90 TABLET | Refills: 1 | Status: SHIPPED | OUTPATIENT
Start: 2023-02-06

## 2023-02-06 RX ORDER — PEN NEEDLE, DIABETIC 30 GX3/16"
1 NEEDLE, DISPOSABLE MISCELLANEOUS DAILY
Qty: 100 EACH | Refills: 1 | Status: SHIPPED | OUTPATIENT
Start: 2023-02-06

## 2023-02-06 NOTE — PROGRESS NOTES
Chief Complaint   Patient presents with   • Diabetes        Diana Cassidy is a 67 y.o. female had concerns including Diabetes.    T2DM and Hyperparathyroidism.    T2DM:  Diabetes was diagnosed .  Complications include none.  Last ophtho exam was , Dr. Morillo.  Current medications for diabetes include Metformin 1,000 mg BID, Tradjenta 5 mg QD, Tresiba 25 units qhs, Farxiga 10 mg QD.   She checks her blood sugar one times per day.   Hypos: none  FSB-160's    Diet: She eats what she wants, she is not limiting much.    She has a parathyroidectomy in September by Dr. Garrett. She denies any changes in relation to this.    Past Medical History:   Diagnosis Date   • Diabetes mellitus (HCC)    • Disease of thyroid gland    • Elevated cholesterol    • Fatty liver disease, nonalcoholic     STATES SHE QUIT DRINKING   • GERD (gastroesophageal reflux disease)    • Hyperlipidemia NA    Don't remember   • Hypertension    • Serum calcium elevated    • Vitamin D deficiency      Past Surgical History:   Procedure Laterality Date   • COLONOSCOPY     • ENDOSCOPY     • GALLBLADDER SURGERY     • PARATHYROIDECTOMY N/A 2022    Procedure: PARATHYROIDECTOMY FROZEN SECTION INTROP PTH;  Surgeon: Moi Garrett MD;  Location: [facility];  Service: ENT;  Laterality: N/A;   • TUBAL ABDOMINAL LIGATION        Family History   Problem Relation Age of Onset   • Cancer Mother         Lung cancer   • Cancer Father         Pancreatic/ Lung Cancer   • Cancer Sister         Lymphoma/bladder   • Cancer Sister         Bladder Cancer   • Breast cancer Neg Hx       Social History     Socioeconomic History   • Marital status:    Tobacco Use   • Smoking status: Former     Packs/day: 1.50     Years: 30.00     Pack years: 45.00     Types: Cigarettes     Quit date: 2012     Years since quitting: 10.7   • Smokeless tobacco: Never   Vaping Use   • Vaping Use: Never used   Substance and Sexual Activity   • Alcohol use: Not  Currently     Comment: QUIT 2011---DRANK TOO MUCH 2X A WEEK   • Drug use: Never   • Sexual activity: Not Currently     Birth control/protection: Surgical      No Known Allergies   Current Outpatient Medications on File Prior to Visit   Medication Sig Dispense Refill   • Cholecalciferol (Vitamin D) 50 MCG (2000 UT) tablet Take 2,000 Units by mouth 1 (One) Time Per Week.     • citalopram (CeleXA) 40 MG tablet Take 1 tablet by mouth Daily.     • ezetimibe (ZETIA) 10 MG tablet Take 1 tablet by mouth Daily.     • felodipine (PLENDIL) 5 MG 24 hr tablet Take 5 mg by mouth Daily.     • furosemide (LASIX) 40 MG tablet Take 1 tablet by mouth Daily.     • metoprolol tartrate (LOPRESSOR) 25 MG tablet Take 25 mg by mouth 2 (Two) Times a Day.       No current facility-administered medications on file prior to visit.        The following portions of the patient's history were reviewed and updated as appropriate: allergies, current medications, past family history, past medical history, past social history, past surgical history and problem list.    Review of Systems   Constitutional: Positive for fatigue. Negative for activity change, appetite change, chills, diaphoresis, fever, unexpected weight gain and unexpected weight loss.   Eyes: Positive for blurred vision and visual disturbance.   Cardiovascular: Negative for palpitations.   Gastrointestinal: Positive for diarrhea.   Endocrine: Negative for cold intolerance, heat intolerance, polydipsia, polyphagia and polyuria.   Musculoskeletal: Negative.    Skin: Positive for dry skin.   Neurological: Negative for dizziness, weakness, light-headedness, numbness, headache, memory problem and confusion.   Hematological: Bruises/bleeds easily.   Psychiatric/Behavioral: Negative for agitation and sleep disturbance. The patient is not nervous/anxious.    All other systems reviewed and are negative.     /70 (BP Location: Left arm, Patient Position: Sitting, Cuff Size: Adult)   Pulse  "74   Ht 160 cm (63\")   Wt 81.8 kg (180 lb 4.8 oz)   LMP  (LMP Unknown)   SpO2 96%   BMI 31.94 kg/m²      Physical Exam  Vitals reviewed.   Constitutional:       Appearance: Normal appearance.   Eyes:      Extraocular Movements: Extraocular movements intact.   Cardiovascular:      Rate and Rhythm: Normal rate.   Pulmonary:      Effort: Pulmonary effort is normal.   Skin:     General: Skin is warm.   Neurological:      Mental Status: She is alert and oriented to person, place, and time.   Psychiatric:         Mood and Affect: Mood normal.         Behavior: Behavior normal.         Thought Content: Thought content normal.         Judgment: Judgment normal.        Labs/Imaging  CMP:  Lab Results   Component Value Date    BUN 17 02/08/2023    CREATININE 0.66 02/08/2023    BCR 26 02/08/2023     02/08/2023    K 4.5 02/08/2023    CO2 23 02/08/2023    CALCIUM 9.4 02/08/2023    PROTENTOTREF 6.6 02/08/2023    ALBUMIN 4.5 02/08/2023    LABGLOBREF 2.1 02/08/2023    LABIL2 2.1 02/08/2023    BILITOT 0.2 02/08/2023    ALKPHOS 58 02/08/2023    AST 26 02/08/2023    ALT 15 02/08/2023     Lipid Panel:  No results found for: CHOL, TRIG, HDL, VLDL, LDL  HbA1c:  Lab Results   Component Value Date    HGBA1C 8.1 02/06/2023    HGBA1C 9.1 11/03/2022     Glucose:  Lab Results   Component Value Date    POCGLU 156 (A) 02/06/2023     TSH:  Lab Results   Component Value Date    TSH 2.400 01/19/2022       Assessment and Plan    Diagnoses and all orders for this visit:    1. Type 2 diabetes mellitus with hyperglycemia, with long-term current use of insulin (HCC) (Primary)  Assessment & Plan:  -Diabetes is improving with A1c down from 9.1 to 8.1.  -Discussed dietary and exercise guidelines.   -Discussed importance of yearly eye exams.  -Discussed importance of checking BG's regularly.  -Continue Metformin 1,000 mg BID.  -Continue Tradjenta 5 mg QD.  -Continue Tresiba 25 units qhs.  -Continue Farxiga 10 mg QD. Discussed the medication's MOA " and that it may cause more frequent urination particularly upon starting the medication. Take one tablet in the morning with or without food. Encouraged to drink plenty of water as to not get dehydrated especially in warmer weather or with activity. Also discussed there may be an increased incidence of UTIs or yeast infections and to monitor for signs/symptoms.  -S/S hypoglycemia reviewed with Rule of 15's advised.  -Follow-up in 6 months.    Orders:  -     POC Glucose Fingerstick  -     POC Glycosylated Hemoglobin (Hb A1C)       Return in about 6 months (around 8/6/2023) for Follow-up appointment, A1C. The patient was instructed to contact the clinic with any interval questions or concerns.    This document has been electronically signed by FIDELINA Marks  February 16, 2023 08:43 EST  Endocrinology

## 2023-02-06 NOTE — PROGRESS NOTES
Specialty Pharmacy Patient Management Program  Endocrinology Reassessment     Diana Cassidy is a 67 y.o. female with Type 2 Diabetes seen by an Endocrinology provider and enrolled in the Endocrinology Patient Management program offered by [facility].  A follow-up outreach was conducted, including assessment of continued therapy appropriateness, medication adherence, and side effect incidence and management for Tradjenta, Metformin, Farxiga and Tresiba.     Patient is currently taking Metformin 1,000 mg twice daily, Tradjenta 5 mg daily, Farxiga 10 mg daily and Tresiba 25 units at night. She reports tolerating these medications well with no side effects. Patient reports she sometimes has diarrhea but she thinks it may be due to her diet. She checks her BG once daily and reports it has been around 135-150 mg/dL in the morning. She denies low BG <70.     Patient reports that she missed 1 dose of her diabetes medication due to a colonoscopy she has 01/31/23. Patient states she has not missed any doses since then. No concern with adherence issues at this time.    Patient denies personal/family history of thyroid cancer, denies issues with pancreas/pancreatitis, and denies issues with recurrent UTI/yeast infections. She hasn't tried any other medications in the past for diabetes. In the past she was misdiagnosed with cirrhosis of the liver. Patient states that the specialist she sees now informed her of the misdiagnosis. He routinely follows her labs.     Changes to Insurance Coverage or Financial Support  None    Relevant Past Medical History and Comorbidities  Relevant medical history and concomitant health conditions were discussed with the patient. The patient's chart has been reviewed for relevant past medical history and comorbid health conditions and updated as necessary.   Past Medical History:   Diagnosis Date   • Diabetes mellitus (HCC)    • Disease of thyroid gland    • Elevated  cholesterol    • Fatty liver disease, nonalcoholic     STATES SHE QUIT DRINKING   • GERD (gastroesophageal reflux disease)    • Hyperlipidemia NA    Don't remember   • Hypertension 2007   • Serum calcium elevated    • Vitamin D deficiency 2020     Social History     Socioeconomic History   • Marital status:    Tobacco Use   • Smoking status: Former     Packs/day: 1.50     Years: 30.00     Pack years: 45.00     Types: Cigarettes     Quit date: 5/9/2012     Years since quitting: 10.7   • Smokeless tobacco: Never   Vaping Use   • Vaping Use: Never used   Substance and Sexual Activity   • Alcohol use: Not Currently     Comment: QUIT 2011---DRANK TOO MUCH 2X A WEEK   • Drug use: Never   • Sexual activity: Not Currently     Birth control/protection: Surgical       Problem list reviewed by Susu Reilly RPH on 2/6/2023 at 10:15 AM    Allergies  Known allergies and reactions were discussed with the patient. The patient's chart has been reviewed for allergy information and updated as necessary.   Patient has no known allergies.    Allergies reviewed by Susu Reilly RPH on 2/6/2023 at 10:13 AM    Relevant Laboratory Values  A1C Last 3 Results    HGBA1C Last 3 Results 8/3/22 11/3/22 2/6/23   Hemoglobin A1C 8.4 9.1 8.1           Lab Results   Component Value Date    HGBA1C 8.1 02/06/2023     Lab Results   Component Value Date    GLUCOSE 193 (H) 11/03/2022    CALCIUM 10.2 11/03/2022     11/03/2022    K 4.2 11/03/2022    CO2 24.4 11/03/2022     11/03/2022    BUN 15 11/03/2022    CREATININE 0.77 11/03/2022    BCR 19.5 11/03/2022    ANIONGAP 14.6 11/03/2022     No results found for: CHOL, CHLPL, TRIG, HDL, LDL, LDLDIRECT      Current Medication List  This medication list has been reviewed with the patient and evaluated for any interactions or necessary modifications/recommendations, and updated to include all prescription medications, OTC medications, and supplements the patient is currently taking.   This list reflects what is contained in the patient's profile, which has also been marked as reviewed to communicate to other providers it is the most up to date version of the patient's current medication therapy.     Current Outpatient Medications:   •  Cholecalciferol (Vitamin D) 50 MCG (2000 UT) tablet, Take 2,000 Units by mouth 1 (One) Time Per Week., Disp: , Rfl:   •  citalopram (CeleXA) 40 MG tablet, Take 1 tablet by mouth Daily., Disp: , Rfl:   •  dapagliflozin Propanediol (Farxiga) 10 MG tablet, Take 1 tablet by mouth Daily., Disp: 90 tablet, Rfl: 1  •  ezetimibe (ZETIA) 10 MG tablet, Take 1 tablet by mouth Daily., Disp: , Rfl:   •  felodipine (PLENDIL) 5 MG 24 hr tablet, Take 5 mg by mouth Daily., Disp: , Rfl:   •  furosemide (LASIX) 40 MG tablet, Take 1 tablet by mouth Daily., Disp: , Rfl:   •  Insulin Pen Needle (Pen Needles) 32G X 4 MM misc, Use to inject insulin once daily as directed., Disp: 100 each, Rfl: 1  •  metFORMIN (GLUCOPHAGE) 1000 MG tablet, Take 1 tablet by mouth 2 (Two) Times a Day With Meals., Disp: 180 tablet, Rfl: 1  •  metoprolol tartrate (LOPRESSOR) 25 MG tablet, Take 25 mg by mouth 2 (Two) Times a Day., Disp: , Rfl:   •  Tradjenta 5 MG tablet tablet, Take 1 tablet by mouth Daily., Disp: 90 tablet, Rfl: 1  •  Tresiba FlexTouch 100 UNIT/ML solution pen-injector injection, Inject 25 Units under the skin into the appropriate area as directed Every Night., Disp: 15 mL, Rfl: 2    Medicines reviewed by Susu Reilly [facility] on 2/6/2023 at 10:15 AM    Drug Interactions  None relevant to diabetes care     Recommended Medications Assessment  • Aspirin - Indicated, not currently taking  • Statin - Indicated, not currently taking (on Zetia)  • ACEi/ARB - Indicated, not currently taking     Current 10-Year ASCVD Risk: cannot calculate - need updated labs.      Adverse Drug Reactions  • Adverse Reactions Experienced: None  • Plan for ADR Management: Not Required    Hospitalizations and Urgent Care  Since Last Assessment  • Hospitalizations or Admissions: None  • ED Visits: None  • Urgent Office Visits: None    Adherence and Self-Administration  Adherence related patient's specialty therapy was discussed with the patient. The Adherence segment of this outreach has been reviewed and updated.     • Ongoing or New Barriers to Patient Adherence and/or Self-Administration: None  • Methods for Supporting Patient Adherence and/or Self-Administration: None required    Goals of Therapy  Goals related to the patient's specialty therapy was discussed with the patient. The Patient Goals segment of this outreach has been reviewed and updated.    Goals     • Consistently take medications as prescribed     • HEMOGLOBIN A1C < 7            Quality of Life Assessment   Quality of Life related to the patient's specialty therapy was discussed with the patient. The QOL segment of this outreach has been reviewed and updated.     Quality of Life Assessment  Quality of Life Improvement Scale: Moderately better    Reassessment Plan & Follow-Up  1. Patient's diabetes has improved with A1C down from 9.1% to 8.1%.    2. Medication Therapy Changes: None  3. Additional Plans, Therapy Recommendations, or Therapy Problems to Be Addressed:   a. Updated lipid panel would be beneficial to determine appropriate lipid lowering therapy for patient.  b. Patient requires assistance, we are helping patient sign up with  assistance program  c. Will send refills for her diabetic medications to the apothecary and patient interested in mail order.  4. Pharmacist to perform regular reassessments no more than (6) months from the previous assessment.  5. Care Coordinator to set up future refill outreaches, coordinate prescription delivery, and escalate clinical questions to pharmacist.     Attestation  I attest that the specialty medication(s) addressed above are appropriate for the patient based on my reassessment.  If the prescribed therapy is  at any point deemed not appropriate based on the current or future assessments, a consultation will be initiated with the patient's specialty care provider to determine the best course of action. The revised plan of therapy will be documented along with any additional patient education provided.     Susu Reilly RPH  2/6/2023  10:18 EST

## 2023-02-06 NOTE — ASSESSMENT & PLAN NOTE
-Diabetes is improving with A1c down from 9.1 to 8.1.  -Discussed dietary and exercise guidelines.   -Discussed importance of yearly eye exams.  -Discussed importance of checking BG's regularly.  -Continue Metformin 1,000 mg BID.  -Continue Tradjenta 5 mg QD.  -Continue Tresiba 25 units qhs.  -Continue Farxiga 10 mg QD. Discussed the medication's MOA and that it may cause more frequent urination particularly upon starting the medication. Take one tablet in the morning with or without food. Encouraged to drink plenty of water as to not get dehydrated especially in warmer weather or with activity. Also discussed there may be an increased incidence of UTIs or yeast infections and to monitor for signs/symptoms.  -S/S hypoglycemia reviewed with Rule of 15's advised.  -Follow-up in 6 months.

## 2023-02-09 LAB
ALBUMIN SERPL-MCNC: 4.5 G/DL (ref 3.8–4.8)
ALBUMIN/GLOB SERPL: 2.1 {RATIO} (ref 1.2–2.2)
ALP SERPL-CCNC: 58 IU/L (ref 44–121)
ALT SERPL-CCNC: 15 IU/L (ref 0–32)
AST SERPL-CCNC: 26 IU/L (ref 0–40)
BILIRUB SERPL-MCNC: 0.2 MG/DL (ref 0–1.2)
BUN SERPL-MCNC: 17 MG/DL (ref 8–27)
BUN/CREAT SERPL: 26 (ref 12–28)
CALCIUM SERPL-MCNC: 9.4 MG/DL (ref 8.7–10.3)
CHLORIDE SERPL-SCNC: 104 MMOL/L (ref 96–106)
CO2 SERPL-SCNC: 23 MMOL/L (ref 20–29)
CREAT SERPL-MCNC: 0.66 MG/DL (ref 0.57–1)
EGFRCR SERPLBLD CKD-EPI 2021: 95 ML/MIN/1.73
GLOBULIN SER CALC-MCNC: 2.1 G/DL (ref 1.5–4.5)
GLUCOSE SERPL-MCNC: 145 MG/DL (ref 70–99)
POTASSIUM SERPL-SCNC: 4.5 MMOL/L (ref 3.5–5.2)
PROT SERPL-MCNC: 6.6 G/DL (ref 6–8.5)
PTH-INTACT SERPL-MCNC: 28 PG/ML (ref 15–65)
SODIUM SERPL-SCNC: 142 MMOL/L (ref 134–144)

## 2023-02-21 NOTE — PROGRESS NOTES
Patient assistance program application for [email or web address] through [place] [employer] has been approved. Patient is aware.     Amira Nuñez, OpalD  Clinical Specialty Pharmacist, Endocrinology  02/21/23  14:01 EST

## 2023-02-27 ENCOUNTER — SPECIALTY PHARMACY (OUTPATIENT)
Dept: PHARMACY | Facility: HOSPITAL | Age: 68
End: 2023-02-27
Payer: MEDICARE

## 2023-05-03 ENCOUNTER — SPECIALTY PHARMACY (OUTPATIENT)
Dept: PHARMACY | Facility: HOSPITAL | Age: 68
End: 2023-05-03
Payer: MEDICARE

## 2023-05-03 NOTE — PROGRESS NOTES
Pt now recieves Tradjenta, Farxiga, and Tresiba through manufacture assistance programs.     Thank you,     Amira Nuñez, PharmD, [facility]  Clinical Specialty Pharmacist, Endocrinology  05/03/23  13:28 EDT

## 2023-05-30 ENCOUNTER — SPECIALTY PHARMACY (OUTPATIENT)
Dept: PHARMACY | Facility: HOSPITAL | Age: 68
End: 2023-05-30

## 2023-05-30 NOTE — PROGRESS NOTES
If she wants to continue taking Mounjaro, then yes she will need to stop taking Tradjenta, as she cannot take these 2 together.

## 2023-08-07 ENCOUNTER — OFFICE VISIT (OUTPATIENT)
Dept: ENDOCRINOLOGY | Facility: CLINIC | Age: 68
End: 2023-08-07
Payer: MEDICARE

## 2023-08-07 ENCOUNTER — SPECIALTY PHARMACY (OUTPATIENT)
Dept: PHARMACY | Facility: HOSPITAL | Age: 68
End: 2023-08-07
Payer: MEDICARE

## 2023-08-07 VITALS
WEIGHT: 175.2 LBS | DIASTOLIC BLOOD PRESSURE: 58 MMHG | SYSTOLIC BLOOD PRESSURE: 110 MMHG | HEART RATE: 86 BPM | HEIGHT: 63 IN | OXYGEN SATURATION: 94 % | BODY MASS INDEX: 31.04 KG/M2

## 2023-08-07 DIAGNOSIS — Z79.4 TYPE 2 DIABETES MELLITUS WITH HYPERGLYCEMIA, WITH LONG-TERM CURRENT USE OF INSULIN: Primary | ICD-10-CM

## 2023-08-07 DIAGNOSIS — E11.65 TYPE 2 DIABETES MELLITUS WITH HYPERGLYCEMIA, WITH LONG-TERM CURRENT USE OF INSULIN: Primary | ICD-10-CM

## 2023-08-07 LAB
EXPIRATION DATE: ABNORMAL
EXPIRATION DATE: NORMAL
GLUCOSE BLDC GLUCOMTR-MCNC: 133 MG/DL (ref 70–130)
HBA1C MFR BLD: 6.3 %
Lab: ABNORMAL
Lab: NORMAL

## 2023-08-07 PROCEDURE — 1159F MED LIST DOCD IN RCRD: CPT | Performed by: NURSE PRACTITIONER

## 2023-08-07 PROCEDURE — 1160F RVW MEDS BY RX/DR IN RCRD: CPT | Performed by: NURSE PRACTITIONER

## 2023-08-07 PROCEDURE — 3044F HG A1C LEVEL LT 7.0%: CPT | Performed by: NURSE PRACTITIONER

## 2023-08-07 PROCEDURE — 82570 ASSAY OF URINE CREATININE: CPT | Performed by: NURSE PRACTITIONER

## 2023-08-07 PROCEDURE — 83036 HEMOGLOBIN GLYCOSYLATED A1C: CPT | Performed by: NURSE PRACTITIONER

## 2023-08-07 PROCEDURE — 82043 UR ALBUMIN QUANTITATIVE: CPT | Performed by: NURSE PRACTITIONER

## 2023-08-07 PROCEDURE — 82947 ASSAY GLUCOSE BLOOD QUANT: CPT | Performed by: NURSE PRACTITIONER

## 2023-08-07 PROCEDURE — 99214 OFFICE O/P EST MOD 30 MIN: CPT | Performed by: NURSE PRACTITIONER

## 2023-08-07 RX ORDER — TIRZEPATIDE 7.5 MG/.5ML
7.5 INJECTION, SOLUTION SUBCUTANEOUS WEEKLY
Qty: 2 ML | Refills: 2 | Status: SHIPPED | OUTPATIENT
Start: 2023-08-07

## 2023-08-07 NOTE — PROGRESS NOTES
Specialty Pharmacy Patient Management Program  Endocrinology Reassessment     Dinaa Cassidy is a 68 y.o. female with Type 2 Diabetes seen by an Endocrinology provider and enrolled in the Endocrinology Patient Management program offered by [facility]. A follow-up outreach was conducted, including assessment of continued therapy appropriateness, medication adherence, and side effect incidence and management for Mounjaro, Metformin, Farxiga and Tresiba.     Patient is currently taking Metformin 1,000 mg twice daily, Mounjaro 5 mg weekly, Farxiga 10 mg daily and Tresiba 25 units at night. She reports tolerating these medications well with no side effects. She checks her BG once daily. She denies low BG <70.      Patient denies personal/family history of thyroid cancer, denies issues with pancreas/pancreatitis, and denies issues with recurrent UTI/yeast infections. She hasn't tried any other medications in the past for diabetes. In the past she was misdiagnosed with cirrhosis of the liver. Patient states that the specialist she sees now informed her of the misdiagnosis. He routinely follows her labs.     In the past, the patient has tried:     Drug Dose Reason for Discontinuation Notes   Tradjenta  5 mg Switched to Mounjaro                  Changes to Insurance Coverage or Financial Support  None    Relevant Past Medical History and Comorbidities  Relevant medical history and concomitant health conditions were discussed with the patient. The patient's chart has been reviewed for relevant past medical history and comorbid health conditions and updated as necessary.   Past Medical History:   Diagnosis Date    Diabetes mellitus     Disease of thyroid gland     Elevated cholesterol     Fatty liver disease, nonalcoholic     STATES SHE QUIT DRINKING    GERD (gastroesophageal reflux disease)     Hyperlipidemia NA    Don't remember    Hypertension 2007    Serum calcium elevated     Vitamin D deficiency       Social History     Socioeconomic History    Marital status:    Tobacco Use    Smoking status: Former     Packs/day: 1.50     Years: 30.00     Pack years: 45.00     Types: Cigarettes     Quit date: 2012     Years since quittin.2    Smokeless tobacco: Never   Vaping Use    Vaping Use: Never used   Substance and Sexual Activity    Alcohol use: Not Currently     Comment: QUIT ---DRANK TOO MUCH 2X A WEEK    Drug use: Never    Sexual activity: Not Currently     Birth control/protection: Surgical       Problem list reviewed by Amira Nuñez RPH on 2023 at 10:02 AM    Allergies  Known allergies and reactions were discussed with the patient. The patient's chart has been reviewed for allergy information and updated as necessary.   Patient has no known allergies.    Allergies reviewed by Amira Nuñez RPH on 2023 at 10:15 AM    Relevant Laboratory Values  A1C Last 3 Results          11/3/2022    14:05 2023    09:58 2023    09:47   HGBA1C Last 3 Results   Hemoglobin A1C 9.1  8.1  6.3      Lab Results   Component Value Date    HGBA1C 6.3 2023     Lab Results   Component Value Date    GLUCOSE 145 (H) 2023    CALCIUM 9.4 2023     2023    K 4.5 2023    CO2 23 2023     2023    BUN 17 2023    CREATININE 0.66 2023    BCR 26 2023    ANIONGAP 14.6 2022     No results found for: CHOL, CHLPL, TRIG, HDL, LDL, LDLDIRECT      Current Medication List  This medication list has been reviewed with the patient and evaluated for any interactions or necessary modifications/recommendations, and updated to include all prescription medications, OTC medications, and supplements the patient is currently taking.  This list reflects what is contained in the patient's profile, which has also been marked as reviewed to communicate to other providers it is the most up to date version of the patient's current medication therapy.      Current Outpatient Medications:     Cholecalciferol (Vitamin D) 50 MCG (2000 UT) tablet, Take 1 tablet by mouth 1 (One) Time Per Week., Disp: , Rfl:     citalopram (CeleXA) 40 MG tablet, Take 1 tablet by mouth Daily., Disp: , Rfl:     dapagliflozin Propanediol (Farxiga) 10 MG tablet, Take 1 tablet by mouth Daily., Disp: 90 tablet, Rfl: 1    ezetimibe (ZETIA) 10 MG tablet, Take 1 tablet by mouth Daily., Disp: , Rfl:     felodipine (PLENDIL) 5 MG 24 hr tablet, Take 1 tablet by mouth Daily., Disp: , Rfl:     furosemide (LASIX) 40 MG tablet, Take 1 tablet by mouth Daily., Disp: , Rfl:     Insulin Pen Needle (Pen Needles) 32G X 4 MM misc, Use to inject insulin once daily as directed., Disp: 100 each, Rfl: 1    metFORMIN (GLUCOPHAGE) 1000 MG tablet, Take 1 tablet by mouth 2 (Two) Times a Day With Meals., Disp: 180 tablet, Rfl: 1    metoprolol tartrate (LOPRESSOR) 25 MG tablet, Take 1 tablet by mouth 2 (Two) Times a Day., Disp: , Rfl:     Tirzepatide (Mounjaro) 7.5 MG/0.5ML solution pen-injector, Inject 0.5 mL under the skin into the appropriate area as directed 1 (One) Time Per Week., Disp: 2 mL, Rfl: 2    Tresiba FlexTouch 100 UNIT/ML solution pen-injector injection, Inject 25 Units under the skin into the appropriate area as directed Every Night., Disp: 15 mL, Rfl: 2    Medicines reviewed by Amira Nuñez [facility] on 8/7/2023 at 10:15 AM    Drug Interactions  The hypoglycemic effect of Insulin may be increased or prolonged by Beta-Adrenergic Blockers. Minor cardiovascular abnormalities may occur during hypoglycemic episodes.      Recommended Medications Assessment  Aspirin - Indicated, not currently taking  Statin - Indicated, not currently taking (on Zetia)  ACEi/ARB - Indicated, not currently taking     Current 10-Year ASCVD Risk: cannot calculate - need updated labs.      Adverse Drug Reactions  Adverse Reactions Experienced: None  Plan for ADR Management: Not Required    Hospitalizations and Urgent Care Since Last  Assessment  Hospitalizations or Admissions: None  ED Visits: None  Urgent Office Visits: None    Adherence and Self-Administration  Adherence related patient's specialty therapy was discussed with the patient. The Adherence segment of this outreach has been reviewed and updated.     Ongoing or New Barriers to Patient Adherence and/or Self-Administration: None  Methods for Supporting Patient Adherence and/or Self-Administration: None required    Goals of Therapy  Goals related to the patient's specialty therapy was discussed with the patient. The Patient Goals segment of this outreach has been reviewed and updated.    Goals        Consistently take medications as prescribed      HEMOGLOBIN A1C < 7      Specialty Pharmacy General Goal      Prevent hypoglycemia             Quality of Life Assessment   Quality of Life related to the patient's specialty therapy was discussed with the patient. The QOL segment of this outreach has been reviewed and updated.     Quality of Life Assessment  Quality of Life Improvement Scale: Moderately better    Reassessment Plan & Follow-Up  Patient's diabetes continues to improve and is now at goal with A1C down to 6.3% from 8.1%. No pharmacologic recommendations at this time.   Medication Therapy Changes: None discussed with patient.   Additional Plans, Therapy Recommendations, or Therapy Problems to Be Addressed:   Will send refills for Metformin to the apothecary. Pt gets other medications through patient assistance program.   Pharmacist to perform regular reassessments no more than (6) months from the previous assessment.  Care Coordinator to set up future refill outreaches, coordinate prescription delivery, and escalate clinical questions to pharmacist.     Attestation  I attest that the specialty medication(s) addressed above are appropriate for the patient based on my reassessment.  If the prescribed therapy is at any point deemed not appropriate based on the current or future  assessments, a consultation will be initiated with the patient's specialty care provider to determine the best course of action. The revised plan of therapy will be documented along with any additional patient education provided.     Amira Nuñez, PharmD, [facility]  Clinical Specialty Pharmacist, Endocrinology   8/7/2023  10:15 EDT

## 2023-08-07 NOTE — ASSESSMENT & PLAN NOTE
-Diabetes is improving with A1c down to 6.3.  -Discussed dietary and exercise guidelines.   -Discussed importance of yearly eye exams.  -Discussed importance of checking BG's regularly.  -Continue Metformin 1,000 mg BID.  -Increase Mounjaro 7.5 mg weekly.  Patient has no personal history of pancreatitis, no family history of MEN syndrome or medullary thyroid cancer. Possible side effects including nausea, bloating, other GI upset and rarely pancreatitis were discussed. She was advised to call the office with any symptoms or concerns.    -Continue Tresiba 25 units qhs. Discussed decreasing dose if hypoglycemia develops.  -Continue Farxiga 10 mg QD. Discussed the medication's MOA and that it may cause more frequent urination particularly upon starting the medication. Take one tablet in the morning with or without food. Encouraged to drink plenty of water as to not get dehydrated especially in warmer weather or with activity. Also discussed there may be an increased incidence of UTIs or yeast infections and to monitor for signs/symptoms.  -S/S hypoglycemia reviewed with Rule of 15's advised.  -Follow-up in 3 months.

## 2023-08-07 NOTE — PROGRESS NOTES
Chief Complaint   Patient presents with    Diabetes        Diana Cassidy is a 68 y.o. female had concerns including Diabetes.    T2DM and Hyperparathyroidism.    T2DM:  Diabetes was diagnosed .  Complications include none.  Last ophtho exam was , Dr. Morillo.  Current medications for diabetes include Metformin 1,000 mg BID, Mounjaro 5 mg weekly, Tresiba 25 units qhs, Farxiga 10 mg QD.   She checks her blood sugar one times per day.   Hypos: none  FSB-160's    Diet: She eats what she wants, she is not limiting much.    She has a parathyroidectomy in September by Dr. Garrett. She denies any changes in relation to this.    Past Medical History:   Diagnosis Date    Diabetes mellitus     Disease of thyroid gland     Elevated cholesterol     Fatty liver disease, nonalcoholic     STATES SHE QUIT DRINKING    GERD (gastroesophageal reflux disease)     Hyperlipidemia NA    Don't remember    Hypertension     Serum calcium elevated     Vitamin D deficiency      Past Surgical History:   Procedure Laterality Date    COLONOSCOPY      ENDOSCOPY      GALLBLADDER SURGERY      PARATHYROIDECTOMY N/A 2022    Procedure: PARATHYROIDECTOMY FROZEN SECTION INTROP PTH;  Surgeon: Moi Garrett MD;  Location: [facility];  Service: ENT;  Laterality: N/A;    TUBAL ABDOMINAL LIGATION        Family History   Problem Relation Age of Onset    Cancer Mother         Lung cancer    Cancer Father         Pancreatic/ Lung Cancer    Cancer Sister         Lymphoma/bladder    Cancer Sister         Bladder Cancer    Breast cancer Neg Hx       Social History     Socioeconomic History    Marital status:    Tobacco Use    Smoking status: Former     Packs/day: 1.50     Years: 30.00     Pack years: 45.00     Types: Cigarettes     Quit date: 2012     Years since quittin.2    Smokeless tobacco: Never   Vaping Use    Vaping Use: Never used   Substance and Sexual Activity    Alcohol use: Not Currently     Comment: QUIT  2011---DRANK TOO MUCH 2X A WEEK    Drug use: Never    Sexual activity: Not Currently     Birth control/protection: Surgical      No Known Allergies   Current Outpatient Medications on File Prior to Visit   Medication Sig Dispense Refill    Cholecalciferol (Vitamin D) 50 MCG (2000 UT) tablet Take 1 tablet by mouth 1 (One) Time Per Week.      citalopram (CeleXA) 40 MG tablet Take 1 tablet by mouth Daily.      dapagliflozin Propanediol (Farxiga) 10 MG tablet Take 1 tablet by mouth Daily. 90 tablet 1    ezetimibe (ZETIA) 10 MG tablet Take 1 tablet by mouth Daily.      felodipine (PLENDIL) 5 MG 24 hr tablet Take 1 tablet by mouth Daily.      furosemide (LASIX) 40 MG tablet Take 1 tablet by mouth Daily.      Insulin Pen Needle (Pen Needles) 32G X 4 MM misc Use to inject insulin once daily as directed. 100 each 1    metFORMIN (GLUCOPHAGE) 1000 MG tablet Take 1 tablet by mouth 2 (Two) Times a Day With Meals. 180 tablet 1    metoprolol tartrate (LOPRESSOR) 25 MG tablet Take 1 tablet by mouth 2 (Two) Times a Day.      Tresiba FlexTouch 100 UNIT/ML solution pen-injector injection Inject 25 Units under the skin into the appropriate area as directed Every Night. 15 mL 2    [DISCONTINUED] Tirzepatide 5 MG/0.5ML solution pen-injector Inject 0.5 mL under the skin into the appropriate area as directed 1 (One) Time Per Week.      [DISCONTINUED] Tradjenta 5 MG tablet tablet Take 1 tablet by mouth Daily. 90 tablet 1     No current facility-administered medications on file prior to visit.        The following portions of the patient's history were reviewed and updated as appropriate: allergies, current medications, past family history, past medical history, past social history, past surgical history and problem list.    Review of Systems   Constitutional:  Positive for fatigue. Negative for activity change, appetite change, chills, diaphoresis, fever, unexpected weight gain and unexpected weight loss.   Eyes:  Positive for blurred  "vision and visual disturbance.   Cardiovascular:  Negative for palpitations.   Gastrointestinal:  Positive for diarrhea.   Endocrine: Negative for cold intolerance, heat intolerance, polydipsia, polyphagia and polyuria.   Musculoskeletal: Negative.    Skin:  Positive for dry skin.   Neurological:  Negative for dizziness, weakness, light-headedness, numbness, headache, memory problem and confusion.   Hematological:  Bruises/bleeds easily.   Psychiatric/Behavioral:  Negative for agitation and sleep disturbance. The patient is not nervous/anxious.    All other systems reviewed and are negative.     /58 (BP Location: Left arm, Patient Position: Sitting, Cuff Size: Adult)   Pulse 86   Ht 160 cm (63\")   Wt 79.5 kg (175 lb 3.2 oz)   LMP  (LMP Unknown)   SpO2 94%   BMI 31.04 kg/mý      Physical Exam  Vitals reviewed.   Constitutional:       Appearance: Normal appearance.   Eyes:      Extraocular Movements: Extraocular movements intact.   Cardiovascular:      Rate and Rhythm: Normal rate.      Pulses:           Dorsalis pedis pulses are 2+ on the right side and 2+ on the left side.        Posterior tibial pulses are 2+ on the right side and 2+ on the left side.   Pulmonary:      Effort: Pulmonary effort is normal.   Feet:      Right foot:      Protective Sensation: 10 sites tested.  10 sites sensed.      Skin integrity: Callus and dry skin present.      Toenail Condition: Right toenails are normal.      Left foot:      Protective Sensation: 10 sites tested.  10 sites sensed.      Skin integrity: Callus and dry skin present.      Toenail Condition: Left toenails are normal.      Comments: Diabetic Foot Exam Performed and Monofilament Test Performed     Skin:     General: Skin is warm.   Neurological:      Mental Status: She is alert and oriented to person, place, and time.   Psychiatric:         Mood and Affect: Mood normal.         Behavior: Behavior normal.         Thought Content: Thought content normal.        "  Judgment: Judgment normal.      Labs/Imaging  CMP:  Lab Results   Component Value Date    BUN 17 02/08/2023    CREATININE 0.66 02/08/2023    BCR 26 02/08/2023     02/08/2023    K 4.5 02/08/2023    CO2 23 02/08/2023    CALCIUM 9.4 02/08/2023    PROTENTOTREF 6.6 02/08/2023    ALBUMIN 4.5 02/08/2023    LABGLOBREF 2.1 02/08/2023    LABIL2 2.1 02/08/2023    BILITOT 0.2 02/08/2023    ALKPHOS 58 02/08/2023    AST 26 02/08/2023    ALT 15 02/08/2023     Lipid Panel:  No results found for: CHOL, TRIG, HDL, VLDL, LDL  HbA1c:  Lab Results   Component Value Date    HGBA1C 6.3 08/07/2023    HGBA1C 8.1 02/06/2023     Glucose:  Lab Results   Component Value Date    POCGLU 133 (A) 08/07/2023     TSH:  Lab Results   Component Value Date    TSH 2.400 01/19/2022       Assessment and Plan    Diagnoses and all orders for this visit:    1. Type 2 diabetes mellitus with hyperglycemia, with long-term current use of insulin (Primary)  Assessment & Plan:  -Diabetes is improving with A1c down to 6.3.  -Discussed dietary and exercise guidelines.   -Discussed importance of yearly eye exams.  -Discussed importance of checking BG's regularly.  -Continue Metformin 1,000 mg BID.  -Increase Mounjaro 7.5 mg weekly.  Patient has no personal history of pancreatitis, no family history of MEN syndrome or medullary thyroid cancer. Possible side effects including nausea, bloating, other GI upset and rarely pancreatitis were discussed. She was advised to call the office with any symptoms or concerns.    -Continue Tresiba 25 units qhs. Discussed decreasing dose if hypoglycemia develops.  -Continue Farxiga 10 mg QD. Discussed the medication's MOA and that it may cause more frequent urination particularly upon starting the medication. Take one tablet in the morning with or without food. Encouraged to drink plenty of water as to not get dehydrated especially in warmer weather or with activity. Also discussed there may be an increased incidence of UTIs or  yeast infections and to monitor for signs/symptoms.  -S/S hypoglycemia reviewed with Rule of 15's advised.  -Follow-up in 3 months.    Orders:  -     POC Glucose, Blood  -     POC Glycosylated Hemoglobin (Hb A1C)  -     Microalbumin / Creatinine Urine Ratio - Urine, Clean Catch    Other orders  -     Tirzepatide (Mounjaro) 7.5 MG/0.5ML solution pen-injector; Inject 0.5 mL under the skin into the appropriate area as directed 1 (One) Time Per Week.  Dispense: 2 mL; Refill: 2         Return in about 3 months (around 11/7/2023) for Follow-up appointment, A1C. The patient was instructed to contact the clinic with any interval questions or concerns.    This document has been electronically signed by FIDELINA Marks  August 7, 2023 10:10 EDT  Endocrinology

## 2023-08-08 LAB
ALBUMIN/CREAT UR: <21 MG/G CREAT (ref 0–29)
CREAT UR-MCNC: 14.3 MG/DL
MICROALBUMIN UR-MCNC: <3 UG/ML

## 2023-08-25 ENCOUNTER — SPECIALTY PHARMACY (OUTPATIENT)
Dept: PHARMACY | Facility: HOSPITAL | Age: 68
End: 2023-08-25
Payer: MEDICARE

## 2023-08-25 NOTE — PROGRESS NOTES
Specialty Pharmacy Refill Coordination Note      Name:  Diana Cassidy  :  1955  Date:  2023         Past Medical History:   Diagnosis Date    Diabetes mellitus     Disease of thyroid gland     Elevated cholesterol     Fatty liver disease, nonalcoholic     STATES SHE QUIT DRINKING    GERD (gastroesophageal reflux disease)     Hyperlipidemia NA    Don't remember    Hypertension     Serum calcium elevated     Vitamin D deficiency        Past Surgical History:   Procedure Laterality Date    COLONOSCOPY      ENDOSCOPY      GALLBLADDER SURGERY      PARATHYROIDECTOMY N/A 2022    Procedure: PARATHYROIDECTOMY FROZEN SECTION INTROP PTH;  Surgeon: Moi Garrett MD;  Location: [facility];  Service: ENT;  Laterality: N/A;    TUBAL ABDOMINAL LIGATION         Social History     Socioeconomic History    Marital status:    Tobacco Use    Smoking status: Former     Packs/day: 1.50     Years: 30.00     Pack years: 45.00     Types: Cigarettes     Quit date: 2012     Years since quittin.3    Smokeless tobacco: Never   Vaping Use    Vaping Use: Never used   Substance and Sexual Activity    Alcohol use: Not Currently     Comment: QUIT ---DRANK TOO MUCH 2X A WEEK    Drug use: Never    Sexual activity: Not Currently     Birth control/protection: Surgical       Family History   Problem Relation Age of Onset    Cancer Mother         Lung cancer    Cancer Father         Pancreatic/ Lung Cancer    Cancer Sister         Lymphoma/bladder    Cancer Sister         Bladder Cancer    Breast cancer Neg Hx        No Known Allergies    Current Outpatient Medications   Medication Sig Dispense Refill    Cholecalciferol (Vitamin D) 50 MCG (2000) tablet Take 1 tablet by mouth 1 (One) Time Per Week.      citalopram (CeleXA) 40 MG tablet Take 1 tablet by mouth Daily.      dapagliflozin Propanediol (Farxiga) 10 MG tablet Take 1 tablet by mouth Daily. 90 tablet 1    ezetimibe (ZETIA) 10 MG tablet Take 1  tablet by mouth Daily.      felodipine (PLENDIL) 5 MG 24 hr tablet Take 1 tablet by mouth Daily.      furosemide (LASIX) 40 MG tablet Take 1 tablet by mouth Daily.      Insulin Pen Needle (Pen Needles) 32G X 4 MM misc Use to inject insulin once daily as directed. 100 each 1    metFORMIN (GLUCOPHAGE) 1000 MG tablet Take 1 tablet by mouth 2 (Two) Times a Day With Meals. 180 tablet 1    metoprolol tartrate (LOPRESSOR) 25 MG tablet Take 1 tablet by mouth 2 (Two) Times a Day.      Tirzepatide (Mounjaro) 7.5 MG/0.5ML solution pen-injector Inject 0.5 mL under the skin into the appropriate area as directed 1 (One) Time Per Week. 2 mL 2    Tresiba FlexTouch 100 UNIT/ML solution pen-injector injection Inject 25 Units under the skin into the appropriate area as directed Every Night. 15 mL 2     No current facility-administered medications for this visit.         ASSESSMENT/PLAN:      Diana is a 68 y.o. female contacted today regarding refills of  Metformin 1000mg specialty medication(s).    Reviewed and verified with patient:       Specialty medication(s) and dose(s) confirmed: yes    Refill Questions      Flowsheet Row Most Recent Value   Changes to allergies? No   Changes to medications? No   New conditions since last clinic visit No   Unplanned office visit, urgent care, ED, or hospital admission in the last 4 weeks  No   How does patient/caregiver feel medication is working? Good   Financial problems or insurance changes  No   Since the previous refill, were any specialty medication doses or scheduled injections missed or delayed?  No   If yes, please provide the amount none   Why were doses missed? na   Does this patient require a clinical escalation to a pharmacist? No                       Follow-up: 180 day(s)     Rola Lara [facility]  Specialty Pharmacy Technician

## 2023-10-24 ENCOUNTER — TRANSCRIBE ORDERS (OUTPATIENT)
Dept: ADMINISTRATIVE | Facility: HOSPITAL | Age: 68
End: 2023-10-24
Payer: MEDICARE

## 2023-10-24 DIAGNOSIS — Z78.0 ASYMPTOMATIC MENOPAUSAL STATE: Primary | ICD-10-CM

## 2023-10-24 DIAGNOSIS — Z12.31 VISIT FOR SCREENING MAMMOGRAM: ICD-10-CM

## 2023-11-03 ENCOUNTER — HOSPITAL ENCOUNTER (OUTPATIENT)
Dept: BONE DENSITY | Facility: HOSPITAL | Age: 68
Discharge: HOME | End: 2023-11-03
Payer: MEDICARE

## 2023-11-03 ENCOUNTER — HOSPITAL ENCOUNTER (OUTPATIENT)
Dept: MAMMOGRAPHY | Facility: HOSPITAL | Age: 68
Discharge: HOME | End: 2023-11-03
Payer: MEDICARE

## 2023-11-03 DIAGNOSIS — Z78.0 ASYMPTOMATIC MENOPAUSAL STATE: ICD-10-CM

## 2023-11-03 DIAGNOSIS — Z12.31 VISIT FOR SCREENING MAMMOGRAM: ICD-10-CM

## 2023-11-03 PROCEDURE — 77067 SCR MAMMO BI INCL CAD: CPT

## 2023-11-03 PROCEDURE — 77063 BREAST TOMOSYNTHESIS BI: CPT

## 2023-11-03 PROCEDURE — 77080 DXA BONE DENSITY AXIAL: CPT

## 2023-11-06 ENCOUNTER — HOSPITAL ENCOUNTER (OUTPATIENT)
Dept: RESPIRATORY THERAPY | Facility: HOSPITAL | Age: 68
Discharge: HOME | End: 2023-11-06
Admitting: SURGERY
Payer: MEDICARE

## 2023-11-06 ENCOUNTER — TRANSCRIBE ORDERS (OUTPATIENT)
Dept: ADMINISTRATIVE | Facility: HOSPITAL | Age: 68
End: 2023-11-06
Payer: MEDICARE

## 2023-11-06 DIAGNOSIS — Z01.810 PRE-OPERATIVE CARDIOVASCULAR EXAMINATION: Primary | ICD-10-CM

## 2023-11-06 DIAGNOSIS — Z01.810 PRE-OPERATIVE CARDIOVASCULAR EXAMINATION: ICD-10-CM

## 2023-11-06 DIAGNOSIS — Z01.812 PRE-OPERATIVE LABORATORY EXAMINATION: ICD-10-CM

## 2023-11-06 LAB
QT INTERVAL: 406 MS
QTC INTERVAL: 456 MS

## 2023-11-06 PROCEDURE — 93005 ELECTROCARDIOGRAM TRACING: CPT | Performed by: SURGERY

## 2023-11-06 PROCEDURE — 93010 ELECTROCARDIOGRAM REPORT: CPT | Performed by: INTERNAL MEDICINE

## 2023-11-08 ENCOUNTER — SPECIALTY PHARMACY (OUTPATIENT)
Dept: PHARMACY | Facility: HOSPITAL | Age: 68
End: 2023-11-08
Payer: MEDICARE

## 2023-11-08 ENCOUNTER — OFFICE VISIT (OUTPATIENT)
Dept: ENDOCRINOLOGY | Facility: CLINIC | Age: 68
End: 2023-11-08
Payer: MEDICARE

## 2023-11-08 VITALS
SYSTOLIC BLOOD PRESSURE: 126 MMHG | BODY MASS INDEX: 30.12 KG/M2 | WEIGHT: 170 LBS | HEART RATE: 68 BPM | OXYGEN SATURATION: 98 % | HEIGHT: 63 IN | DIASTOLIC BLOOD PRESSURE: 88 MMHG

## 2023-11-08 DIAGNOSIS — Z79.4 TYPE 2 DIABETES MELLITUS WITH HYPERGLYCEMIA, WITH LONG-TERM CURRENT USE OF INSULIN: ICD-10-CM

## 2023-11-08 DIAGNOSIS — E11.65 TYPE 2 DIABETES MELLITUS WITH HYPERGLYCEMIA, WITH LONG-TERM CURRENT USE OF INSULIN: ICD-10-CM

## 2023-11-08 DIAGNOSIS — Z79.4 TYPE 2 DIABETES MELLITUS WITH HYPERGLYCEMIA, WITH LONG-TERM CURRENT USE OF INSULIN: Primary | ICD-10-CM

## 2023-11-08 DIAGNOSIS — E11.65 TYPE 2 DIABETES MELLITUS WITH HYPERGLYCEMIA, WITH LONG-TERM CURRENT USE OF INSULIN: Primary | ICD-10-CM

## 2023-11-08 RX ORDER — PEN NEEDLE, DIABETIC 30 GX3/16"
1 NEEDLE, DISPOSABLE MISCELLANEOUS DAILY
Qty: 100 EACH | Refills: 1 | Status: SHIPPED | OUTPATIENT
Start: 2023-11-08

## 2023-11-08 RX ORDER — DAPAGLIFLOZIN 10 MG/1
10 TABLET, FILM COATED ORAL DAILY
COMMUNITY

## 2023-11-08 RX ORDER — INSULIN DEGLUDEC INJECTION 100 U/ML
25 INJECTION, SOLUTION SUBCUTANEOUS NIGHTLY
COMMUNITY

## 2023-11-08 NOTE — PROGRESS NOTES
Chief Complaint   Patient presents with    Diabetes        Diana Cassidy is a 68 y.o. female had concerns including Diabetes.    T2DM.    T2DM:  Diabetes was diagnosed .  Complications include none.  Last ophtho exam was , Dr. Morillo.  Current medications for diabetes include Metformin 1,000 mg BID, Mounjaro 10 mg weekly, Tresiba 25 units qhs, Farxiga 10 mg QD.   She checks her blood sugar one times per day.   Hypos: none  FSB-160's    She is having hemorrhoid surgery next week.  She is not currently taking her GLP-1 in prep for her upcoming surgery.  She will restart this medication after her procedure.    Diet: She eats what she wants, she is not limiting much.    Past Medical History:   Diagnosis Date    Diabetes mellitus     Disease of thyroid gland     Elevated cholesterol     Fatty liver disease, nonalcoholic     STATES SHE QUIT DRINKING    GERD (gastroesophageal reflux disease)     Hyperlipidemia NA    Don't remember    Hypertension     Serum calcium elevated     Vitamin D deficiency      Past Surgical History:   Procedure Laterality Date    COLONOSCOPY      ENDOSCOPY      GALLBLADDER SURGERY      PARATHYROIDECTOMY N/A 2022    Procedure: PARATHYROIDECTOMY FROZEN SECTION INTROP PTH;  Surgeon: oMi Garrett MD;  Location: [facility];  Service: ENT;  Laterality: N/A;    TUBAL ABDOMINAL LIGATION        Family History   Problem Relation Age of Onset    Cancer Mother         Lung cancer    Cancer Father         Pancreatic/ Lung Cancer    Cancer Sister         Lymphoma/bladder    Cancer Sister         Bladder Cancer    Breast cancer Neg Hx       Social History     Socioeconomic History    Marital status:    Tobacco Use    Smoking status: Former     Packs/day: 1.50     Years: 30.00     Additional pack years: 0.00     Total pack years: 45.00     Types: Cigarettes     Quit date: 2012     Years since quittin.5    Smokeless tobacco: Never   Vaping Use    Vaping Use: Never  used   Substance and Sexual Activity    Alcohol use: Not Currently     Comment: QUIT 2011---DRANK TOO MUCH 2X A WEEK    Drug use: Never    Sexual activity: Not Currently     Birth control/protection: Surgical      No Known Allergies   Current Outpatient Medications on File Prior to Visit   Medication Sig Dispense Refill    Cholecalciferol (Vitamin D) 50 MCG (2000 UT) tablet Take 1 tablet by mouth 1 (One) Time Per Week.      citalopram (CeleXA) 40 MG tablet Take 1 tablet by mouth Daily.      ezetimibe (ZETIA) 10 MG tablet Take 1 tablet by mouth Daily.      felodipine (PLENDIL) 5 MG 24 hr tablet Take 1 tablet by mouth Daily.      furosemide (LASIX) 40 MG tablet Take 1 tablet by mouth Daily.      metoprolol tartrate (LOPRESSOR) 25 MG tablet Take 1 tablet by mouth 2 (Two) Times a Day.      Tirzepatide (Mounjaro) 7.5 MG/0.5ML solution pen-injector Inject 0.5 mL under the skin into the appropriate area as directed 1 (One) Time Per Week. 2 mL 2    [DISCONTINUED] dapagliflozin Propanediol (Farxiga) 10 MG tablet Take 1 tablet by mouth Daily. 90 tablet 1    [DISCONTINUED] Insulin Pen Needle (Pen Needles) 32G X 4 MM misc Use to inject insulin once daily as directed. 100 each 1    [DISCONTINUED] metFORMIN (GLUCOPHAGE) 1000 MG tablet Take 1 tablet by mouth 2 (Two) Times a Day With Meals. 180 tablet 1    [DISCONTINUED] Tresiba FlexTouch 100 UNIT/ML solution pen-injector injection Inject 25 Units under the skin into the appropriate area as directed Every Night. 15 mL 2     No current facility-administered medications on file prior to visit.        The following portions of the patient's history were reviewed and updated as appropriate: allergies, current medications, past family history, past medical history, past social history, past surgical history and problem list.    Review of Systems   Constitutional:  Positive for fatigue. Negative for activity change, appetite change, chills, diaphoresis, fever, unexpected weight gain and  "unexpected weight loss.   Eyes:  Positive for blurred vision and visual disturbance.   Cardiovascular:  Negative for palpitations.   Gastrointestinal:  Positive for diarrhea.   Endocrine: Negative for cold intolerance, heat intolerance, polydipsia, polyphagia and polyuria.   Musculoskeletal: Negative.    Skin:  Positive for dry skin.   Neurological:  Negative for dizziness, weakness, light-headedness, numbness, headache, memory problem and confusion.   Hematological:  Bruises/bleeds easily.   Psychiatric/Behavioral:  Negative for agitation and sleep disturbance. The patient is not nervous/anxious.    All other systems reviewed and are negative.    /88 (BP Location: Left arm, Patient Position: Sitting, Cuff Size: Adult)   Pulse 68   Ht 160 cm (63\")   Wt 77.1 kg (170 lb)   LMP  (LMP Unknown)   SpO2 98%   BMI 30.11 kg/m²      Physical Exam  Vitals reviewed.   Constitutional:       Appearance: Normal appearance.   Eyes:      Extraocular Movements: Extraocular movements intact.   Cardiovascular:      Rate and Rhythm: Normal rate.   Pulmonary:      Effort: Pulmonary effort is normal.   Skin:     General: Skin is warm.   Neurological:      Mental Status: She is alert and oriented to person, place, and time.   Psychiatric:         Mood and Affect: Mood normal.         Behavior: Behavior normal.         Thought Content: Thought content normal.         Judgment: Judgment normal.        Labs/Imaging  CMP:  Lab Results   Component Value Date    BUN 17 02/08/2023    CREATININE 0.66 02/08/2023    BCR 26 02/08/2023     02/08/2023    K 4.5 02/08/2023    CO2 23 02/08/2023    CALCIUM 9.4 02/08/2023    PROTENTOTREF 6.6 02/08/2023    ALBUMIN 4.5 02/08/2023    LABGLOBREF 2.1 02/08/2023    LABIL2 2.1 02/08/2023    BILITOT 0.2 02/08/2023    ALKPHOS 58 02/08/2023    AST 26 02/08/2023    ALT 15 02/08/2023     Lipid Panel:  No results found for: \"CHOL\", \"TRIG\", \"HDL\", \"VLDL\", \"LDL\"  HbA1c:  Lab Results   Component Value " Date    HGBA1C 6.3 08/07/2023    HGBA1C 8.1 02/06/2023   10/2023: 5.9    Glucose:  Lab Results   Component Value Date    POCGLU 133 (A) 08/07/2023     TSH:  Lab Results   Component Value Date    TSH 2.400 01/19/2022       Assessment and Plan    Diagnoses and all orders for this visit:    1. Type 2 diabetes mellitus with hyperglycemia, with long-term current use of insulin (Primary)  Assessment & Plan:  -Diabetes is improving with A1c down to 5.9.  -Discussed dietary and exercise guidelines.   -Discussed importance of yearly eye exams.  -Discussed importance of checking BG's regularly.  -Continue Metformin 1,000 mg BID.  -Continue Mounjaro 10 mg weekly.  Patient has no personal history of pancreatitis, no family history of MEN syndrome or medullary thyroid cancer. Possible side effects including nausea, bloating, other GI upset and rarely pancreatitis were discussed. She was advised to call the office with any symptoms or concerns.   -Continue Tresiba 25 units qhs. Discussed decreasing dose if hypoglycemia develops.  -Continue Farxiga 10 mg QD. Discussed the medication's MOA and that it may cause more frequent urination particularly upon starting the medication. Take one tablet in the morning with or without food. Encouraged to drink plenty of water as to not get dehydrated especially in warmer weather or with activity. Also discussed there may be an increased incidence of UTIs or yeast infections and to monitor for signs/symptoms.  -S/S hypoglycemia reviewed with Rule of 15's advised.  -Follow-up in 6 months.    Orders:  -     POC Glucose, Blood  -     POC Glycosylated Hemoglobin (Hb A1C)         Return in about 6 months (around 5/8/2024) for Follow-up appointment, A1C. The patient was instructed to contact the clinic with any interval questions or concerns.    This document has been electronically signed by FIDELINA Marks  November 8, 2023 11:11 EST  Endocrinology

## 2023-11-08 NOTE — PROGRESS NOTES
Specialty Pharmacy Patient Management Program  Endocrinology Reassessment     Diana Cassidy is a 68 y.o. female with Type 2 Diabetes seen by an Endocrinology provider and enrolled in the Endocrinology Patient Management program offered by [facility]. A follow-up outreach was conducted, including assessment of continued therapy appropriateness, medication adherence, and side effect incidence and management for Mounjaro, Metformin, Farxiga and Tresiba.     Patient is currently taking Metformin 1,000 mg twice daily, Mounjaro 7.5 mg weekly, Farxiga 10 mg daily and Tresiba 25 units at night. She reports tolerating these medications well with no side effects. She checks her BG once daily. She denies low BG <70. She reports that her A1C was 5.9% at her PCP's office. She has hemorrhoid surgery scheduled for next week.      Patient denies personal/family history of thyroid cancer, denies issues with pancreas/pancreatitis, and denies issues with recurrent UTI/yeast infections. In the past she was misdiagnosed with cirrhosis of the liver. Patient states that the specialist she sees now informed her of the misdiagnosis. He routinely follows her labs.     In the past, the patient has tried:     Drug Dose Reason for Discontinuation Notes   Tradjenta  5 mg Switched to Mounjaro                  Changes to Insurance Coverage or Financial Support  None    Farxiga and Tresiba - manufacture patient assistance program    Relevant Past Medical History and Comorbidities  Relevant medical history and concomitant health conditions were discussed with the patient. The patient's chart has been reviewed for relevant past medical history and comorbid health conditions and updated as necessary.   Past Medical History:   Diagnosis Date    Diabetes mellitus     Disease of thyroid gland     Elevated cholesterol     Fatty liver disease, nonalcoholic     STATES SHE QUIT DRINKING    GERD (gastroesophageal reflux disease)      "Hyperlipidemia NA    Don't remember    Hypertension 2007    Serum calcium elevated     Vitamin D deficiency 2020     Social History     Socioeconomic History    Marital status:    Tobacco Use    Smoking status: Former     Packs/day: 1.50     Years: 30.00     Additional pack years: 0.00     Total pack years: 45.00     Types: Cigarettes     Quit date: 2012     Years since quittin.5    Smokeless tobacco: Never   Vaping Use    Vaping Use: Never used   Substance and Sexual Activity    Alcohol use: Not Currently     Comment: QUIT ---DRANK TOO MUCH 2X A WEEK    Drug use: Never    Sexual activity: Not Currently     Birth control/protection: Surgical       Problem list reviewed by Amira Nuñez RPH on 2023 at  9:51 AM    Hospitalizations and Urgent Care Since Last Assessment  ED Visits, Admissions or Hospitalizations: None  Urgent Office Visits: None    Allergies  Known allergies and reactions were discussed with the patient. The patient's chart has been reviewed for allergy information and updated as necessary.   No Known Allergies    Allergies reviewed by Amira Nuñez RPH on 2023 at  9:50 AM    Relevant Laboratory Values  A1C Last 3 Results          2023    09:58 2023    09:47   HGBA1C Last 3 Results   Hemoglobin A1C 8.1  6.3      Lab Results   Component Value Date    HGBA1C 6.3 2023     Lab Results   Component Value Date    GLUCOSE 145 (H) 2023    CALCIUM 9.4 2023     2023    K 4.5 2023    CO2 23 2023     2023    BUN 17 2023    CREATININE 0.66 2023    BCR 26 2023    ANIONGAP 14.6 2022     No results found for: \"CHOL\", \"CHLPL\", \"TRIG\", \"HDL\", \"LDL\", \"LDLDIRECT\"  Microalbumin          2023    10:16   Microalbumin   Microalbumin, Urine <3.0        Current Medication List  This medication list has been reviewed with the patient and evaluated for any interactions or necessary " modifications/recommendations, and updated to include all prescription medications, OTC medications, and supplements the patient is currently taking. This list reflects what is contained in the patient's profile, which has also been marked as reviewed to communicate to other providers it is the most up to date version of the patient's current medication therapy.     Current Outpatient Medications:     Cholecalciferol (Vitamin D) 50 MCG (2000 UT) tablet, Take 1 tablet by mouth 1 (One) Time Per Week., Disp: , Rfl:     citalopram (CeleXA) 40 MG tablet, Take 1 tablet by mouth Daily., Disp: , Rfl:     dapagliflozin Propanediol (Farxiga) 10 MG tablet, Take 10 mg by mouth Daily., Disp: , Rfl:     ezetimibe (ZETIA) 10 MG tablet, Take 1 tablet by mouth Daily., Disp: , Rfl:     felodipine (PLENDIL) 5 MG 24 hr tablet, Take 1 tablet by mouth Daily., Disp: , Rfl:     furosemide (LASIX) 40 MG tablet, Take 1 tablet by mouth Daily., Disp: , Rfl:     insulin degludec (Tresiba FlexTouch) 100 UNIT/ML solution pen-injector injection, Inject 25 Units under the skin into the appropriate area as directed Every Night. Getting through manufacture assistance program, Disp: , Rfl:     Insulin Pen Needle (Pen Needles) 32G X 4 MM misc, Use to inject insulin once daily as directed., Disp: 100 each, Rfl: 1    metFORMIN (GLUCOPHAGE) 1000 MG tablet, Take 1 tablet by mouth 2 (Two) Times a Day With Meals., Disp: 180 tablet, Rfl: 1    metoprolol tartrate (LOPRESSOR) 25 MG tablet, Take 1 tablet by mouth 2 (Two) Times a Day., Disp: , Rfl:     Tirzepatide (Mounjaro) 7.5 MG/0.5ML solution pen-injector, Inject 0.5 mL under the skin into the appropriate area as directed 1 (One) Time Per Week., Disp: 2 mL, Rfl: 2    Medicines reviewed by Amira Nuñez, [facility] on 11/8/2023 at  9:52 AM    Drug Interactions  No significant drug-drug interactions with diabetes medications expected according to literature.  The hypoglycemic effect of Insulin may be increased or  prolonged by metoprolol. Minor cardiovascular abnormalities may occur during hypoglycemic episodes.      Recommended Medications Assessment  Aspirin: Not Taking Currently  Statin: Not Taking Currently (on Zetia)  ACEi/ARB: Not Taking Currently    Adverse Drug Reactions  Adverse Reactions Experienced: None    Medication tolerability: Tolerating with no to minimal ADRs  Medication plan: Continue therapy with normal follow-up  Plan for ADR Management: None required     Adherence, Self-Administration, and Current Therapy Problems  Adherence related to the patient's specialty therapy was discussed with the patient. The Adherence segment of this outreach has been reviewed and updated.   Adherence Questions  Linked Medication(s) Assessed: Metformin Hcl (Biguanides)  On average, how many doses/injections does the patient miss per month?: 0  What are the identified reasons for non-adherence or missed doses? : no problems identified  What is the estimated medication adherence level?: %  Based on the patient/caregiver response and refill history, does this patient require an MTP to track adherence improvements?: no    Additional Barriers to Patient Self-Administration: Cost  Methods for Supporting Patient Self-Administration: Enrolled in manufacture patient assistance program for Farxiga and Tresiba.     Recently Close Medication Therapy Problems  No medication therapy recommendations to display    Goals of Therapy  Goals related to the patient's specialty therapy was discussed with the patient. The Patient Goals segment of this outreach has been reviewed and updated.    Goals Addressed Today        Consistently take medications as prescribed      Specialty Pharmacy General Goal      Prevent hypoglycemia               Quality of Life Assessment   Quality of Life related to the patient's enrollment in the patient management program and services provided was discussed with the patient. The QOL segment of this outreach has  been reviewed and updated.   Quality of Life Improvement Scale: Moderately better    Reassessment Plan & Follow-Up  Patient's diabetes continues to improve with A1C down to 5.9% from 6.3%. No pharmacologic recommendations at this time.   Medication Therapy Changes: None today.    Related Plans, Therapy Recommendations, or Issues to Be Addressed: Will send updated RX's for metformin and pen needles to [facility] for shipping services.    Pharmacist to perform regular reassessments no more than (6) months from the previous assessment.  Care Coordinator to set up future refill outreaches, coordinate prescription delivery, and escalate clinical questions to pharmacist.     Attestation  I attest the patient was actively involved in and has agreed to the above plan of care. If the prescribed therapy is at any point deemed not appropriate based on the current or future assessments, a consultation will be initiated with the patient's specialty care provider to determine the best course of action. The revised plan of therapy will be documented along with any required assessments and/or additional patient education provided.    Amira Nuñez, [place], KINGSLEY JACKSON  Clinical Specialty Pharmacist, Endocrinology  11/8/2023  10:11 EST

## 2023-11-08 NOTE — ASSESSMENT & PLAN NOTE
-Diabetes is improving with A1c down to 5.9.  -Discussed dietary and exercise guidelines.   -Discussed importance of yearly eye exams.  -Discussed importance of checking BG's regularly.  -Continue Metformin 1,000 mg BID.  -Continue Mounjaro 10 mg weekly.  Patient has no personal history of pancreatitis, no family history of MEN syndrome or medullary thyroid cancer. Possible side effects including nausea, bloating, other GI upset and rarely pancreatitis were discussed. She was advised to call the office with any symptoms or concerns.   -Continue Tresiba 25 units qhs. Discussed decreasing dose if hypoglycemia develops.  -Continue Farxiga 10 mg QD. Discussed the medication's MOA and that it may cause more frequent urination particularly upon starting the medication. Take one tablet in the morning with or without food. Encouraged to drink plenty of water as to not get dehydrated especially in warmer weather or with activity. Also discussed there may be an increased incidence of UTIs or yeast infections and to monitor for signs/symptoms.  -S/S hypoglycemia reviewed with Rule of 15's advised.  -Follow-up in 6 months.

## 2023-11-20 ENCOUNTER — SPECIALTY PHARMACY (OUTPATIENT)
Dept: PHARMACY | Facility: HOSPITAL | Age: 68
End: 2023-11-20
Payer: MEDICARE

## 2024-03-12 ENCOUNTER — SPECIALTY PHARMACY (OUTPATIENT)
Dept: PHARMACY | Facility: HOSPITAL | Age: 69
End: 2024-03-12
Payer: MEDICARE

## 2024-03-12 NOTE — PROGRESS NOTES
Specialty Pharmacy Patient Management Program  Endocrinology Reassessment     Diana Cassidy is a 68 y.o. female with Type 2 Diabetes seen by an Endocrinology provider and enrolled in the Endocrinology Patient Management program offered by [facility]. A follow-up outreach was conducted, including assessment of continued therapy appropriateness, medication adherence, and side effect incidence and management for Mounjaro, Metformin, Farxiga and Tresiba.     Patient is currently taking Mounjaro 10 mg weekly, Farxiga 10 mg daily and Tresiba 20 units at night. She reports tolerating these medications well with no side effects. Her local pharmacy is unable to get Mounjaro at this time. [facility] has in stock today. She would like to get while she is here today.      Patient denies personal/family history of thyroid cancer, denies issues with pancreas/pancreatitis, and denies issues with recurrent UTI/yeast infections. In the past she was misdiagnosed with cirrhosis of the liver. Patient states that the specialist she sees now informed her of the misdiagnosis. He routinely follows her labs.     In the past, the patient has tried:     Drug Dose Reason for Discontinuation Notes   Tradjenta  5 mg Switched to Mounjaro                  Changes to Insurance Coverage or Financial Support  None    Farxiga and Tresiba - manufacture patient assistance program    Relevant Past Medical History and Comorbidities  Relevant medical history and concomitant health conditions were discussed with the patient. The patient's chart has been reviewed for relevant past medical history and comorbid health conditions and updated as necessary.   Past Medical History:   Diagnosis Date    Diabetes mellitus     Disease of thyroid gland     Elevated cholesterol     Fatty liver disease, nonalcoholic     STATES SHE QUIT DRINKING    GERD (gastroesophageal reflux disease)     Hyperlipidemia NA    Don't remember    Hypertension  "2007    Serum calcium elevated     Vitamin D deficiency 2020     Social History     Socioeconomic History    Marital status:    Tobacco Use    Smoking status: Former     Current packs/day: 0.00     Average packs/day: 1.5 packs/day for 30.0 years (45.0 ttl pk-yrs)     Types: Cigarettes     Start date: 1982     Quit date: 2012     Years since quittin.8    Smokeless tobacco: Never   Vaping Use    Vaping status: Never Used   Substance and Sexual Activity    Alcohol use: Not Currently     Comment: QUIT ---DRANK TOO MUCH 2X A WEEK    Drug use: Never    Sexual activity: Not Currently     Birth control/protection: Surgical       Problem list reviewed by Amira Nuñez RPH on 3/12/2024 at 11:14 AM    Hospitalizations and Urgent Care Since Last Assessment  ED Visits, Admissions or Hospitalizations: None  Urgent Office Visits: None    Allergies  Known allergies and reactions were discussed with the patient. The patient's chart has been reviewed for allergy information and updated as necessary.   No Known Allergies    Allergies reviewed by Amira Nuñez RPH on 3/12/2024 at 11:14 AM    Relevant Laboratory Values  A1C Last 3 Results          2023    09:47   HGBA1C Last 3 Results   Hemoglobin A1C 6.3      Lab Results   Component Value Date    HGBA1C 6.3 2023     Lab Results   Component Value Date    GLUCOSE 145 (H) 2023    CALCIUM 9.4 2023     2023    K 4.5 2023    CO2 23 2023     2023    BUN 17 2023    CREATININE 0.66 2023    BCR 26 2023    ANIONGAP 14.6 2022     No results found for: \"CHOL\", \"CHLPL\", \"TRIG\", \"HDL\", \"LDL\", \"LDLDIRECT\"  Microalbumin          2023    10:16   Microalbumin   Microalbumin, Urine <3.0        Current Medication List  This medication list has been reviewed with the patient and evaluated for any interactions or necessary modifications/recommendations, and updated to include all prescription " medications, OTC medications, and supplements the patient is currently taking. This list reflects what is contained in the patient's profile, which has also been marked as reviewed to communicate to other providers it is the most up to date version of the patient's current medication therapy.     Current Outpatient Medications:     Cholecalciferol (Vitamin D) 50 MCG (2000 UT) tablet, Take 1 tablet by mouth 1 (One) Time Per Week., Disp: , Rfl:     citalopram (CeleXA) 40 MG tablet, Take 1 tablet by mouth Daily., Disp: , Rfl:     dapagliflozin Propanediol (Farxiga) 10 MG tablet, Take 10 mg by mouth Daily., Disp: , Rfl:     ezetimibe (ZETIA) 10 MG tablet, Take 1 tablet by mouth Daily., Disp: , Rfl:     felodipine (PLENDIL) 5 MG 24 hr tablet, Take 1 tablet by mouth Daily., Disp: , Rfl:     furosemide (LASIX) 40 MG tablet, Take 1 tablet by mouth Daily., Disp: , Rfl:     insulin degludec (Tresiba FlexTouch) 100 UNIT/ML solution pen-injector injection, Inject 20 Units under the skin into the appropriate area as directed Every Night. Getting through manufacture assistance program, Disp: , Rfl:     Insulin Pen Needle (Pen Needles) 32G X 4 MM misc, Use to inject insulin once daily as directed., Disp: 100 each, Rfl: 1    metoprolol tartrate (LOPRESSOR) 25 MG tablet, Take 1 tablet by mouth 2 (Two) Times a Day., Disp: , Rfl:     Tirzepatide (MOUNJARO) 10 MG/0.5ML solution pen-injector pen, Inject 0.5 mL under the skin into the appropriate area as directed 1 (One) Time Per Week., Disp: 6 mL, Rfl: 1    Medicines reviewed by Amira Nuñez [facility] on 3/12/2024 at 11:15 AM    Drug Interactions  No significant drug-drug interactions with diabetes medications expected according to literature.  The hypoglycemic effect of Insulin may be increased or prolonged by metoprolol. Minor cardiovascular abnormalities may occur during hypoglycemic episodes.      Recommended Medications Assessment  Aspirin: Not Taking Currently  Statin: Not Taking  Currently (on Zetia)  ACEi/ARB: Not Taking Currently    Adverse Drug Reactions  Adverse Reactions Experienced: None          Plan for ADR Management: None required     Adherence, Self-Administration, and Current Therapy Problems  Adherence related to the patient's specialty therapy was discussed with the patient. The Adherence segment of this outreach has been reviewed and updated.        Additional Barriers to Patient Self-Administration: Cost  Methods for Supporting Patient Self-Administration: Enrolled in manufacture patient assistance program for Farxiga and Tresiba.     Recently Close Medication Therapy Problems  No medication therapy recommendations to display    Goals of Therapy  Goals related to the patient's specialty therapy was discussed with the patient. The Patient Goals segment of this outreach has been reviewed and updated.    Goals Addressed Today        Consistently take medications as prescribed                 Reassessment Plan & Follow-Up  Patient is unable to get Mounjaro at her local pharmacy today. Dose is in stock at [facility]. She would like to pick-up today.   Provider Medication Therapy Changes: Per Serene Powers, APRN will order  Mounjaro 10 mg weekly   Related Plans, Therapy Recommendations, or Issues to Be Addressed: Will send updated RX's to [facility] for patient to pick-up today.   Pharmacist to perform regular reassessments no more than (6) months from the previous assessment.  Care Coordinator to set up future refill outreaches, coordinate prescription delivery, and escalate clinical questions to pharmacist.     Attestation  I attest the patient was actively involved in and has agreed to the above plan of care. If the prescribed therapy is at any point deemed not appropriate based on the current or future assessments, a consultation will be initiated with the patient's specialty care provider to determine the best course of action. The revised plan of therapy will be  documented along with any required assessments and/or additional patient education provided.    Amira Nuñez, PharmD, MANUEL, KINGSLEY  Clinical Specialty Pharmacist, Endocrinology  3/12/2024  15:30 EDT

## 2024-03-22 ENCOUNTER — TRANSCRIBE ORDERS (OUTPATIENT)
Dept: ADMINISTRATIVE | Facility: HOSPITAL | Age: 69
End: 2024-03-22
Payer: MEDICARE

## 2024-03-22 DIAGNOSIS — F17.210 CIGARETTE SMOKER: Primary | ICD-10-CM

## 2024-04-02 ENCOUNTER — HOSPITAL ENCOUNTER (OUTPATIENT)
Dept: CT IMAGING | Facility: HOSPITAL | Age: 69
Discharge: HOME | End: 2024-04-02
Admitting: NURSE PRACTITIONER
Payer: MEDICARE

## 2024-04-02 DIAGNOSIS — F17.210 CIGARETTE SMOKER: ICD-10-CM

## 2024-04-02 PROCEDURE — 71271 CT THORAX LUNG CANCER SCR C-: CPT

## 2024-04-30 ENCOUNTER — TELEPHONE (OUTPATIENT)
Dept: ENDOCRINOLOGY | Facility: CLINIC | Age: 69
End: 2024-04-30
Payer: MEDICARE

## 2024-04-30 ENCOUNTER — SPECIALTY PHARMACY (OUTPATIENT)
Dept: PHARMACY | Facility: HOSPITAL | Age: 69
End: 2024-04-30
Payer: MEDICARE

## 2024-04-30 NOTE — PROGRESS NOTES
Specialty Pharmacy Refill Coordination Note     Diana is a 69 y.o. female contacted today regarding refills of  Mounjaro specialty medication(s).    Reviewed and verified with patient:       Specialty medication(s) and dose(s) confirmed: yes    Refill Questions      Flowsheet Row Most Recent Value   Changes to allergies? No   Changes to medications? No   New conditions or infections since last clinic visit No   Unplanned office visit, urgent care, ED, or hospital admission in the last 4 weeks  No   How does patient/caregiver feel medication is working? Very good   Financial problems or insurance changes  No   Since the previous refill, were any specialty medication doses or scheduled injections missed or delayed?  No   If yes, please provide the amount na   Why were doses missed? na   Does this patient require a clinical escalation to a pharmacist? No            Delivery Questions      Flowsheet Row Most Recent Value   Copay verified? Yes   Copay amount 79$ in donut hole   Copay form of payment Credit/debit on file                   Follow-up: 30 day(s)     Syeda Angel, Pharmacy Technician  Specialty Pharmacy Technician

## 2024-04-30 NOTE — TELEPHONE ENCOUNTER
Patient called and wants to let you know that currently she is only taking Mounjaro of her DM meds. She says that her PCP took her off them due to her A1C being so good.

## 2024-05-28 ENCOUNTER — SPECIALTY PHARMACY (OUTPATIENT)
Dept: PHARMACY | Facility: HOSPITAL | Age: 69
End: 2024-05-28
Payer: MEDICARE

## 2024-05-28 NOTE — PROGRESS NOTES
Specialty Pharmacy Refill Coordination Note     Diana is a 69 y.o. female contacted today regarding refills of  Mounjaro specialty medication(s).    Reviewed and verified with patient:       Specialty medication(s) and dose(s) confirmed: yes    Refill Questions      Flowsheet Row Most Recent Value   Changes to allergies? No   Changes to medications? No   New conditions or infections since last clinic visit No   Unplanned office visit, urgent care, ED, or hospital admission in the last 4 weeks  No   How does patient/caregiver feel medication is working? Very good   Financial problems or insurance changes  No   Since the previous refill, were any specialty medication doses or scheduled injections missed or delayed?  No   Why were doses missed? na   Does this patient require a clinical escalation to a pharmacist? No            Delivery Questions      Flowsheet Row Most Recent Value   Copay verified? No   Copay amount na   Copay form of payment No copayment ($0)                   Follow-up: 30 day(s)     Syeda Angel Pharmacy Technician  Specialty Pharmacy Technician

## 2024-06-03 ENCOUNTER — OFFICE VISIT (OUTPATIENT)
Dept: ENDOCRINOLOGY | Facility: CLINIC | Age: 69
End: 2024-06-03
Payer: MEDICARE

## 2024-06-03 ENCOUNTER — SPECIALTY PHARMACY (OUTPATIENT)
Dept: PHARMACY | Facility: HOSPITAL | Age: 69
End: 2024-06-03
Payer: MEDICARE

## 2024-06-03 VITALS
OXYGEN SATURATION: 96 % | BODY MASS INDEX: 28.1 KG/M2 | HEIGHT: 63 IN | WEIGHT: 158.6 LBS | HEART RATE: 80 BPM | DIASTOLIC BLOOD PRESSURE: 76 MMHG | SYSTOLIC BLOOD PRESSURE: 124 MMHG

## 2024-06-03 DIAGNOSIS — Z79.4 TYPE 2 DIABETES MELLITUS WITH HYPERGLYCEMIA, WITH LONG-TERM CURRENT USE OF INSULIN: Primary | ICD-10-CM

## 2024-06-03 DIAGNOSIS — E11.65 TYPE 2 DIABETES MELLITUS WITH HYPERGLYCEMIA, WITH LONG-TERM CURRENT USE OF INSULIN: Primary | ICD-10-CM

## 2024-06-03 LAB
EXPIRATION DATE: ABNORMAL
GLUCOSE BLDC GLUCOMTR-MCNC: 150 MG/DL (ref 70–130)
HBA1C MFR BLD: 5.8 % (ref 4.5–5.7)
Lab: ABNORMAL

## 2024-06-03 PROCEDURE — 99214 OFFICE O/P EST MOD 30 MIN: CPT | Performed by: NURSE PRACTITIONER

## 2024-06-03 PROCEDURE — 3044F HG A1C LEVEL LT 7.0%: CPT | Performed by: NURSE PRACTITIONER

## 2024-06-03 PROCEDURE — 83036 HEMOGLOBIN GLYCOSYLATED A1C: CPT | Performed by: NURSE PRACTITIONER

## 2024-06-03 PROCEDURE — 1159F MED LIST DOCD IN RCRD: CPT | Performed by: NURSE PRACTITIONER

## 2024-06-03 PROCEDURE — 1160F RVW MEDS BY RX/DR IN RCRD: CPT | Performed by: NURSE PRACTITIONER

## 2024-06-03 PROCEDURE — 82947 ASSAY GLUCOSE BLOOD QUANT: CPT | Performed by: NURSE PRACTITIONER

## 2024-06-03 NOTE — PROGRESS NOTES
Chief Complaint   Patient presents with    Type 2 diabetes mellitus with hyperglycemia, with long-term        Diana Cassidy is a 69 y.o. female had concerns including Type 2 diabetes mellitus with hyperglycemia, with long-term.    T2DM    T2DM:  Diabetes was diagnosed .  Complications include none.  Last ophtho exam was , Dr. Morillo.  Current medications for diabetes include Mounjaro 10 mg weekly.   She was having hypos and stopped her insulin and Farxiga and this has resolved.  She checks her blood sugar one times per day.   Hypos: none  FSB-160's    Diet: She eats what she wants, she is not limiting much.    Past Medical History:   Diagnosis Date    Diabetes mellitus     Disease of thyroid gland     Elevated cholesterol     Fatty liver disease, nonalcoholic     STATES SHE QUIT DRINKING    GERD (gastroesophageal reflux disease)     Hyperlipidemia NA    Don't remember    Hypertension     Serum calcium elevated     Vitamin D deficiency      Past Surgical History:   Procedure Laterality Date    COLONOSCOPY      ENDOSCOPY      GALLBLADDER SURGERY      PARATHYROIDECTOMY N/A 2022    Procedure: PARATHYROIDECTOMY FROZEN SECTION INTROP PTH;  Surgeon: Moi Garrett MD;  Location: [facility];  Service: ENT;  Laterality: N/A;    TUBAL ABDOMINAL LIGATION        Family History   Problem Relation Age of Onset    Cancer Mother         Lung cancer    Cancer Father         Pancreatic/ Lung Cancer    Cancer Sister         Lymphoma/bladder    Cancer Sister         Bladder Cancer    Breast cancer Neg Hx       Social History     Socioeconomic History    Marital status:    Tobacco Use    Smoking status: Former     Current packs/day: 0.00     Average packs/day: 1.5 packs/day for 30.0 years (45.0 ttl pk-yrs)     Types: Cigarettes     Start date: 1982     Quit date: 2012     Years since quittin.0    Smokeless tobacco: Never   Vaping Use    Vaping status: Never Used   Substance and Sexual  Activity    Alcohol use: Not Currently     Comment: QUIT 2011---DRANK TOO MUCH 2X A WEEK    Drug use: Never    Sexual activity: Not Currently     Birth control/protection: Surgical      No Known Allergies   Current Outpatient Medications on File Prior to Visit   Medication Sig Dispense Refill    Cholecalciferol (Vitamin D) 50 MCG (2000 UT) tablet Take 1 tablet by mouth 1 (One) Time Per Week.      citalopram (CeleXA) 40 MG tablet Take 1 tablet by mouth Daily.      dapagliflozin Propanediol (Farxiga) 10 MG tablet Take 10 mg by mouth Daily. (Patient not taking: Reported on 6/3/2024)      ezetimibe (ZETIA) 10 MG tablet Take 1 tablet by mouth Daily.      felodipine (PLENDIL) 5 MG 24 hr tablet Take 1 tablet by mouth Daily.      furosemide (LASIX) 40 MG tablet Take 1 tablet by mouth Daily.      insulin degludec (Tresiba FlexTouch) 100 UNIT/ML solution pen-injector injection Inject 20 Units under the skin into the appropriate area as directed Every Night. Getting through manufacture assistance program (Patient not taking: Reported on 6/3/2024)      Insulin Pen Needle (Pen Needles) 32G X 4 MM misc Use to inject insulin once daily as directed. 100 each 1    metoprolol tartrate (LOPRESSOR) 25 MG tablet Take 1 tablet by mouth 2 (Two) Times a Day.      [DISCONTINUED] Tirzepatide (MOUNJARO) 10 MG/0.5ML solution pen-injector pen Inject 0.5 mL under the skin into the appropriate area as directed 1 (One) Time Per Week. 6 mL 1     No current facility-administered medications on file prior to visit.      The following portions of the patient's history were reviewed and updated as appropriate: allergies, current medications, past family history, past medical history, past social history, past surgical history and problem list.    Review of Systems   Constitutional:  Positive for fatigue. Negative for activity change, appetite change, chills, diaphoresis, fever, unexpected weight gain and unexpected weight loss.   Eyes:  Positive for  "blurred vision and visual disturbance.   Cardiovascular:  Negative for palpitations.   Gastrointestinal:  Positive for diarrhea.   Endocrine: Negative for cold intolerance, heat intolerance, polydipsia, polyphagia and polyuria.   Musculoskeletal: Negative.    Skin:  Positive for dry skin.   Neurological:  Negative for dizziness, weakness, light-headedness, numbness, headache, memory problem and confusion.   Hematological:  Bruises/bleeds easily.   Psychiatric/Behavioral:  Negative for agitation and sleep disturbance. The patient is not nervous/anxious.    All other systems reviewed and are negative.    /76 (BP Location: Right arm, Patient Position: Sitting, Cuff Size: Adult)   Pulse 80   Ht 160 cm (63\")   Wt 71.9 kg (158 lb 9.6 oz)   LMP  (LMP Unknown)   SpO2 96%   BMI 28.09 kg/m²      Physical Exam  Vitals reviewed.   Constitutional:       Appearance: Normal appearance.   Eyes:      Extraocular Movements: Extraocular movements intact.   Cardiovascular:      Rate and Rhythm: Normal rate.   Pulmonary:      Effort: Pulmonary effort is normal.   Skin:     General: Skin is warm.   Neurological:      Mental Status: She is alert and oriented to person, place, and time.   Psychiatric:         Mood and Affect: Mood normal.         Behavior: Behavior normal.         Thought Content: Thought content normal.         Judgment: Judgment normal.        Labs/Imaging  CMP:  Lab Results   Component Value Date    BUN 17 02/08/2023    CREATININE 0.66 02/08/2023    BCR 26 02/08/2023     02/08/2023    K 4.5 02/08/2023    CO2 23 02/08/2023    CALCIUM 9.4 02/08/2023    PROTENTOTREF 6.6 02/08/2023    ALBUMIN 4.5 02/08/2023    LABGLOBREF 2.1 02/08/2023    LABIL2 2.1 02/08/2023    BILITOT 0.2 02/08/2023    ALKPHOS 58 02/08/2023    AST 26 02/08/2023    ALT 15 02/08/2023     Lipid Panel:  No results found for: \"CHOL\", \"TRIG\", \"HDL\", \"VLDL\", \"LDL\"  HbA1c:  Lab Results   Component Value Date    HGBA1C 5.8 (A) 06/03/2024    " HGBA1C 6.3 08/07/2023   10/2023: 5.9    Glucose:  Lab Results   Component Value Date    POCGLU 150 (A) 06/03/2024     TSH:  Lab Results   Component Value Date    TSH 2.400 01/19/2022       Assessment and Plan    Diagnoses and all orders for this visit:    1. Type 2 diabetes mellitus with hyperglycemia, with long-term current use of insulin (Primary)  Assessment & Plan:  -Diabetes is improving with A1c down to 5.8.  -Discussed dietary and exercise guidelines.   -Discussed importance of yearly eye exams.  -Discussed importance of checking BG's regularly.  -Continue Mounjaro 10 mg weekly.  Patient has no personal history of pancreatitis, no family history of MEN syndrome or medullary thyroid cancer. Possible side effects including nausea, bloating, other GI upset and rarely pancreatitis were discussed. She was advised to call the office with any symptoms or concerns.   -Continue Tresiba 25 units qhs. Discussed decreasing dose if hypoglycemia develops.  -Continue Farxiga 10 mg QD. Discussed the medication's MOA and that it may cause more frequent urination particularly upon starting the medication. Take one tablet in the morning with or without food. Encouraged to drink plenty of water as to not get dehydrated especially in warmer weather or with activity. Also discussed there may be an increased incidence of UTIs or yeast infections and to monitor for signs/symptoms.  -S/S hypoglycemia reviewed with Rule of 15's advised.  -Follow-up in 3 months.    Orders:  -     POC Glycosylated Hemoglobin (Hb A1C)  -     POC Glucose, Blood         Return in about 3 months (around 9/3/2024) for Follow-up appointment, A1C. The patient was instructed to contact the clinic with any interval questions or concerns.    This document has been electronically signed by FIDELINA Marks  Brenna 3, 2024 11:46 EDT  Endocrinology

## 2024-06-03 NOTE — ASSESSMENT & PLAN NOTE
-Diabetes is improving with A1c down to 5.8.  -Discussed dietary and exercise guidelines.   -Discussed importance of yearly eye exams.  -Discussed importance of checking BG's regularly.  -Continue Mounjaro 10 mg weekly.  Patient has no personal history of pancreatitis, no family history of MEN syndrome or medullary thyroid cancer. Possible side effects including nausea, bloating, other GI upset and rarely pancreatitis were discussed. She was advised to call the office with any symptoms or concerns.   -Continue Tresiba 25 units qhs. Discussed decreasing dose if hypoglycemia develops.  -Continue Farxiga 10 mg QD. Discussed the medication's MOA and that it may cause more frequent urination particularly upon starting the medication. Take one tablet in the morning with or without food. Encouraged to drink plenty of water as to not get dehydrated especially in warmer weather or with activity. Also discussed there may be an increased incidence of UTIs or yeast infections and to monitor for signs/symptoms.  -S/S hypoglycemia reviewed with Rule of 15's advised.  -Follow-up in 3 months.

## 2024-06-03 NOTE — PROGRESS NOTES
Specialty Pharmacy Patient Management Program  Endocrinology Reassessment     Diana Cassiyd is a 69 y.o. female with Type 2 Diabetes seen by an Endocrinology provider and enrolled in the Endocrinology Patient Management program offered by [facility]. A follow-up outreach was conducted, including assessment of continued therapy appropriateness, medication adherence, and side effect incidence and management for Mounjaro, Metformin, Farxiga and Tresiba.     Patient is currently taking Mounjaro 10 mg weekly. Patient reports her PCP instructed her to stop taking Farxiga and Tresiba in April 2024. Since stopping these two medications, patient states she has not seen a significant change in her BG. She reports tolerating Mounjaro medications well with no side effects.    Patient checks BG 2-3 times per week, reporting avg -130's. She denies low BG < 70 at this time.     Patient denies personal/family history of thyroid cancer, denies issues with pancreas/pancreatitis, and denies issues with recurrent UTI/yeast infections. In the past she was misdiagnosed with cirrhosis of the liver. Patient states that the specialist she sees now informed her of the misdiagnosis. He routinely follows her labs.     In the past, the patient has tried:     Drug Dose Reason for Discontinuation Notes   Tradjenta  5 mg Switched to Mounjaro                  Changes to Insurance Coverage or Financial Support  None    Farxiga and Tresiba - manufacture patient assistance program    Relevant Past Medical History and Comorbidities  Relevant medical history and concomitant health conditions were discussed with the patient. The patient's chart has been reviewed for relevant past medical history and comorbid health conditions and updated as necessary.   Past Medical History:   Diagnosis Date    Diabetes mellitus     Disease of thyroid gland     Elevated cholesterol     Fatty liver disease, nonalcoholic     STATES SHE QUIT  Problem: Acute Alcohol Withdrawal Syndrome, Risk For/Actual (Adult)  Intervention: Support/Optimize Psychosocial Response to Withdrawal Process   02/14/18 1748 02/15/18 1332   Coping Strategies   Supportive Measures --  active listening utilized   Coping/Psychosocial Interventions   Environmental Support calm environment promoted --    Pain/Comfort/Sleep Interventions   Sleep/Rest Enhancement relaxation techniques promoted;regular sleep/rest pattern promoted --      Intervention: Minimize/Manage Withdrawal Symptoms   02/15/18 1200 02/15/18 1332   Safety Interventions   Safety/Security Measures --  bed alarm set   Environmental Safety Modification assistive device/personal items within reach;clutter free environment maintained --    Positioning   Head Of Bed (HOB) Position --  HOB at 30-45 degrees     Intervention: Monitor/Manage Fluid Electrolyte Balance   02/15/18 1332   Positioning   Positioning semi-Fowlers       Goal: Signs and Symptoms of Listed Potential Problems Will be Absent or Manageable (Acute Alcohol Withdrawal Syndrome, Risk For/Actual)  Outcome: Ongoing (interventions implemented as appropriate)   02/15/18 1332   Acute Alcohol Withdrawal Syndrome, Risk For/Actual   Problems Assessed (Alcohol Withdrawal Syndrome) all   Problems Present (Alcohol Withdrawal Syndrome) situational response       Problem: Fall Risk (Adult)  Intervention: Monitor/Assist with Self Care   02/15/18 1332   Activity   Activity Type activity adjusted per tolerance   Musculoskeletal Interventions   Self-Care Promotion independence encouraged;BADL personal objects within reach;meal setup provided     Intervention: Reduce Risk/Promote Restraint Free Environment   02/15/18 1332   Safety Interventions   Safety/Security Measures bed alarm set   Environmental Safety Modification assistive device/personal items within reach;clutter free environment maintained;room near unit station   Restraint Interventions   Safety Promotion/Fall  "DRINKING    GERD (gastroesophageal reflux disease)     Hyperlipidemia NA    Don't remember    Hypertension 2007    Serum calcium elevated     Vitamin D deficiency 2020     Social History     Socioeconomic History    Marital status:    Tobacco Use    Smoking status: Former     Current packs/day: 0.00     Average packs/day: 1.5 packs/day for 30.0 years (45.0 ttl pk-yrs)     Types: Cigarettes     Start date: 1982     Quit date: 2012     Years since quittin.0    Smokeless tobacco: Never   Vaping Use    Vaping status: Never Used   Substance and Sexual Activity    Alcohol use: Not Currently     Comment: QUIT ---DRANK TOO MUCH 2X A WEEK    Drug use: Never    Sexual activity: Not Currently     Birth control/protection: Surgical       Problem list reviewed by Elma Sánchez RPH on 6/3/2024 at 11:29 AM    Hospitalizations and Urgent Care Since Last Assessment  ED Visits, Admissions or Hospitalizations: None  Urgent Office Visits: None    Allergies  Known allergies and reactions were discussed with the patient. The patient's chart has been reviewed for allergy information and updated as necessary.   No Known Allergies    Allergies reviewed by Elma Sánchez RPH on 6/3/2024 at 11:27 AM    Relevant Laboratory Values  A1C Last 3 Results          2023    09:47 6/3/2024    11:28   HGBA1C Last 3 Results   Hemoglobin A1C 6.3  5.8      Lab Results   Component Value Date    HGBA1C 5.8 (A) 2024     Lab Results   Component Value Date    GLUCOSE 145 (H) 2023    CALCIUM 9.4 2023     2023    K 4.5 2023    CO2 23 2023     2023    BUN 17 2023    CREATININE 0.66 2023    BCR 26 2023    ANIONGAP 14.6 2022     No results found for: \"CHOL\", \"CHLPL\", \"TRIG\", \"HDL\", \"LDL\", \"LDLDIRECT\"  Microalbumin          2023    10:16   Microalbumin   Microalbumin, Urine <3.0        Current Medication List  This medication list has been reviewed " Prevention safety round/check completed;toileting scheduled;nonskid shoes/slippers when out of bed;fall prevention program maintained     Intervention: Review Medications/Identify Contributors to Fall Risk   02/15/18 1332   Safety Interventions   Medication Review/Management medications reviewed       Goal: Identify Related Risk Factors and Signs and Symptoms  Outcome: Ongoing (interventions implemented as appropriate)   02/15/18 1332   Fall Risk   Fall Risk: Related Risk Factors fatigue/slow reaction   Fall Risk: Signs and Symptoms presence of risk factors     Goal: Absence of Falls  Outcome: Ongoing (interventions implemented as appropriate)   02/15/18 1332   Fall Risk (Adult)   Absence of Falls making progress toward outcome       Problem: Patient Care Overview (Adult)  Goal: Plan of Care Review   02/15/18 1332   Coping/Psychosocial Response Interventions   Plan Of Care Reviewed With patient   Patient Care Overview   Progress improving   Outcome Evaluation   Outcome Summary/Follow up Plan Pt sleeping between meals, considering inpatient rehab.      Goal: Adult Individualization and Mutuality  Outcome: Ongoing (interventions implemented as appropriate)         with the patient and evaluated for any interactions or necessary modifications/recommendations, and updated to include all prescription medications, OTC medications, and supplements the patient is currently taking. This list reflects what is contained in the patient's profile, which has also been marked as reviewed to communicate to other providers it is the most up to date version of the patient's current medication therapy.     Current Outpatient Medications:     Cholecalciferol (Vitamin D) 50 MCG (2000 UT) tablet, Take 1 tablet by mouth 1 (One) Time Per Week., Disp: , Rfl:     citalopram (CeleXA) 40 MG tablet, Take 1 tablet by mouth Daily., Disp: , Rfl:     ezetimibe (ZETIA) 10 MG tablet, Take 1 tablet by mouth Daily., Disp: , Rfl:     felodipine (PLENDIL) 5 MG 24 hr tablet, Take 1 tablet by mouth Daily., Disp: , Rfl:     furosemide (LASIX) 40 MG tablet, Take 1 tablet by mouth Daily., Disp: , Rfl:     Insulin Pen Needle (Pen Needles) 32G X 4 MM misc, Use to inject insulin once daily as directed., Disp: 100 each, Rfl: 1    metoprolol tartrate (LOPRESSOR) 25 MG tablet, Take 1 tablet by mouth 2 (Two) Times a Day., Disp: , Rfl:     Tirzepatide (MOUNJARO) 10 MG/0.5ML solution pen-injector pen, Inject 0.5 mL under the skin into the appropriate area as directed 1 (One) Time Per Week., Disp: 6 mL, Rfl: 1    dapagliflozin Propanediol (Farxiga) 10 MG tablet, Take 10 mg by mouth Daily. (Patient not taking: Reported on 6/3/2024), Disp: , Rfl:     insulin degludec (Tresiba FlexTouch) 100 UNIT/ML solution pen-injector injection, Inject 20 Units under the skin into the appropriate area as directed Every Night. Getting through manufacture assistance program (Patient not taking: Reported on 6/3/2024), Disp: , Rfl:     Medicines reviewed by Elma Sánchez RPH on 6/3/2024 at 11:28 AM  Medicines reviewed by Elma Sánchez RPH on 6/3/2024 at 11:29 AM    Drug Interactions  No significant drug-drug interactions with diabetes  medications expected according to literature.  The hypoglycemic effect of Insulin may be increased or prolonged by metoprolol. Minor cardiovascular abnormalities may occur during hypoglycemic episodes.      Recommended Medications Assessment  Aspirin: Not Taking Currently  Statin: Not Taking Currently (on Zetia)  ACEi/ARB: Not Taking Currently    Adverse Drug Reactions  Adverse Reactions Experienced: None    Medication tolerability: Tolerating with no to minimal ADRs  Medication plan: Continue therapy with normal follow-up  Plan for ADR Management: None required     Adherence, Self-Administration, and Current Therapy Problems  Adherence related to the patient's specialty therapy was discussed with the patient. The Adherence segment of this outreach has been reviewed and updated.   Adherence Questions  Linked Medication(s) Assessed: Tirzepatide  On average, how many doses/injections does the patient miss per month?: 0  What are the identified reasons for non-adherence or missed doses? : no problems identified  What is the estimated medication adherence level?: %  Based on the patient/caregiver response and refill history, does this patient require an MTP to track adherence improvements?: no    Additional Barriers to Patient Self-Administration: Cost  Methods for Supporting Patient Self-Administration: Enrolled in manufacture patient assistance program for Farxiga and Tresiba.     Recently Close Medication Therapy Problems  No medication therapy recommendations to display    Goals of Therapy  Goals related to the patient's specialty therapy was discussed with the patient. The Patient Goals segment of this outreach has been reviewed and updated.    Goals Addressed Today        Consistently take medications as prescribed      HEMOGLOBIN A1C < 7      Specialty Pharmacy General Goal      Prevent hypoglycemia             Quality of Life Improvement Scale: 5-No change    Reassessment Plan & Follow-Up  Patient's  diabetes has improved with A1c down to 5.8% from 6.3%.  Provider Medication Therapy Changes: Per Serene Powers, APRN will order  Mounjaro 10 mg weekly   Related Plans, Therapy Recommendations, or Issues to Be Addressed: [facility] to [facility]-up today.   Pharmacist to perform regular reassessments no more than (6) months from the previous assessment.  Care Coordinator to set up future refill outreaches, coordinate prescription delivery, and escalate clinical questions to pharmacist.     Attestation  I attest the patient was actively involved in and has agreed to the above plan of care. If the prescribed therapy is at any point deemed not appropriate based on the current or future assessments, a consultation will be initiated with the patient's specialty care provider to determine the best course of action. The revised plan of therapy will be documented along with any required assessments and/or additional patient education provided.      Thank you,    Elma Sánchez RPH  PGY-1 Community Pharmacy Resident  6/3/2024  16:40 EDT

## 2024-06-25 ENCOUNTER — SPECIALTY PHARMACY (OUTPATIENT)
Dept: PHARMACY | Facility: HOSPITAL | Age: 69
End: 2024-06-25
Payer: MEDICARE

## 2024-07-23 ENCOUNTER — TELEPHONE (OUTPATIENT)
Dept: ENDOCRINOLOGY | Facility: CLINIC | Age: 69
End: 2024-07-23
Payer: MEDICARE

## 2024-07-23 DIAGNOSIS — Z79.4 TYPE 2 DIABETES MELLITUS WITH HYPERGLYCEMIA, WITH LONG-TERM CURRENT USE OF INSULIN: Primary | ICD-10-CM

## 2024-07-23 DIAGNOSIS — E11.65 TYPE 2 DIABETES MELLITUS WITH HYPERGLYCEMIA, WITH LONG-TERM CURRENT USE OF INSULIN: Primary | ICD-10-CM

## 2024-07-23 NOTE — TELEPHONE ENCOUNTER
Patient called and requested a referral to [facility]. The GI office told her it needs to say Transfer of Care. Her GI doctor retired.

## 2024-07-25 ENCOUNTER — SPECIALTY PHARMACY (OUTPATIENT)
Dept: PHARMACY | Facility: HOSPITAL | Age: 69
End: 2024-07-25
Payer: MEDICARE

## 2024-08-21 ENCOUNTER — SPECIALTY PHARMACY (OUTPATIENT)
Dept: PHARMACY | Facility: HOSPITAL | Age: 69
End: 2024-08-21
Payer: MEDICARE

## 2024-08-21 NOTE — PROGRESS NOTES
Specialty Pharmacy Refill Coordination Note     Diana is a 69 y.o. female contacted today regarding refills of  Mounjaro specialty medication(s).    Reviewed and verified with patient:       Specialty medication(s) and dose(s) confirmed: yes    Refill Questions      Flowsheet Row Most Recent Value   Changes to allergies? No   Changes to medications? No   New conditions or infections since last clinic visit No   Unplanned office visit, urgent care, ED, or hospital admission in the last 4 weeks  No   How does patient/caregiver feel medication is working? Very good   Financial problems or insurance changes  No   Since the previous refill, were any specialty medication doses or scheduled injections missed or delayed?  No   Why were doses missed? na   Does this patient require a clinical escalation to a pharmacist? No            Delivery Questions      Flowsheet Row Most Recent Value   Delivery method [employer]   Delivery address verified with patient/caregiver? Yes   Delivery address Home   Number of medications in delivery 1   Medication(s) being filled and delivered Tirzepatide   Doses left of specialty medications 1   Copay verified? No   Copay amount 0   Copay form of payment No copayment ($0)   Ship Date 08/21   Delivery Date 08/22   Signature Required No                   Follow-up: 30 day(s)     Syeda Angel, Pharmacy Technician  Specialty Pharmacy Technician

## 2024-09-13 ENCOUNTER — SPECIALTY PHARMACY (OUTPATIENT)
Dept: PHARMACY | Facility: HOSPITAL | Age: 69
End: 2024-09-13
Payer: MEDICARE

## 2024-09-24 ENCOUNTER — SPECIALTY PHARMACY (OUTPATIENT)
Dept: PHARMACY | Facility: HOSPITAL | Age: 69
End: 2024-09-24
Payer: MEDICARE

## 2024-09-24 ENCOUNTER — OFFICE VISIT (OUTPATIENT)
Dept: ENDOCRINOLOGY | Facility: CLINIC | Age: 69
End: 2024-09-24
Payer: MEDICARE

## 2024-09-24 VITALS
SYSTOLIC BLOOD PRESSURE: 132 MMHG | BODY MASS INDEX: 28.27 KG/M2 | DIASTOLIC BLOOD PRESSURE: 82 MMHG | WEIGHT: 159.6 LBS | HEART RATE: 88 BPM | OXYGEN SATURATION: 95 %

## 2024-09-24 DIAGNOSIS — Z79.4 TYPE 2 DIABETES MELLITUS WITH HYPERGLYCEMIA, WITH LONG-TERM CURRENT USE OF INSULIN: Primary | ICD-10-CM

## 2024-09-24 DIAGNOSIS — E11.65 TYPE 2 DIABETES MELLITUS WITH HYPERGLYCEMIA, WITH LONG-TERM CURRENT USE OF INSULIN: Primary | ICD-10-CM

## 2024-09-24 LAB
EXPIRATION DATE: ABNORMAL
GLUCOSE BLDC GLUCOMTR-MCNC: 158 MG/DL (ref 70–130)
HBA1C MFR BLD: 6 % (ref 4.5–5.7)
Lab: ABNORMAL

## 2024-09-24 PROCEDURE — 82947 ASSAY GLUCOSE BLOOD QUANT: CPT | Performed by: NURSE PRACTITIONER

## 2024-09-24 PROCEDURE — 1160F RVW MEDS BY RX/DR IN RCRD: CPT | Performed by: NURSE PRACTITIONER

## 2024-09-24 PROCEDURE — 99214 OFFICE O/P EST MOD 30 MIN: CPT | Performed by: NURSE PRACTITIONER

## 2024-09-24 PROCEDURE — 82043 UR ALBUMIN QUANTITATIVE: CPT | Performed by: NURSE PRACTITIONER

## 2024-09-24 PROCEDURE — 82570 ASSAY OF URINE CREATININE: CPT | Performed by: NURSE PRACTITIONER

## 2024-09-24 PROCEDURE — 83036 HEMOGLOBIN GLYCOSYLATED A1C: CPT | Performed by: NURSE PRACTITIONER

## 2024-09-24 PROCEDURE — 1159F MED LIST DOCD IN RCRD: CPT | Performed by: NURSE PRACTITIONER

## 2024-09-24 PROCEDURE — 3044F HG A1C LEVEL LT 7.0%: CPT | Performed by: NURSE PRACTITIONER

## 2024-09-25 LAB
ALBUMIN/CREAT UR: 16 MG/G CREAT (ref 0–29)
CREAT UR-MCNC: 324.1 MG/DL
MICROALBUMIN UR-MCNC: 51 UG/ML

## 2024-09-27 DIAGNOSIS — Z79.4 TYPE 2 DIABETES MELLITUS WITH HYPERGLYCEMIA, WITH LONG-TERM CURRENT USE OF INSULIN: Primary | ICD-10-CM

## 2024-09-27 DIAGNOSIS — E11.65 TYPE 2 DIABETES MELLITUS WITH HYPERGLYCEMIA, WITH LONG-TERM CURRENT USE OF INSULIN: Primary | ICD-10-CM

## 2024-10-02 ENCOUNTER — LAB (OUTPATIENT)
Dept: LAB | Facility: HOSPITAL | Age: 69
End: 2024-10-02
Payer: MEDICARE

## 2024-10-02 LAB
ALBUMIN SERPL-MCNC: 4.2 G/DL (ref 3.5–5.2)
ALBUMIN/GLOB SERPL: 1.7 G/DL
ALP SERPL-CCNC: 49 U/L (ref 39–117)
ALT SERPL W P-5'-P-CCNC: 10 U/L (ref 1–33)
ANION GAP SERPL CALCULATED.3IONS-SCNC: 11.3 MMOL/L (ref 5–15)
AST SERPL-CCNC: 20 U/L (ref 1–32)
BILIRUB SERPL-MCNC: 0.3 MG/DL (ref 0–1.2)
BUN SERPL-MCNC: 21 MG/DL (ref 8–23)
BUN/CREAT SERPL: 25.6 (ref 7–25)
CALCIUM SPEC-SCNC: 10 MG/DL (ref 8.6–10.5)
CHLORIDE SERPL-SCNC: 107 MMOL/L (ref 98–107)
CO2 SERPL-SCNC: 26.7 MMOL/L (ref 22–29)
CREAT SERPL-MCNC: 0.82 MG/DL (ref 0.57–1)
EGFRCR SERPLBLD CKD-EPI 2021: 76.6 ML/MIN/1.73
GLOBULIN UR ELPH-MCNC: 2.5 GM/DL
GLUCOSE SERPL-MCNC: 131 MG/DL (ref 65–99)
POTASSIUM SERPL-SCNC: 4.4 MMOL/L (ref 3.5–5.2)
PROT SERPL-MCNC: 6.7 G/DL (ref 6–8.5)
SODIUM SERPL-SCNC: 145 MMOL/L (ref 136–145)

## 2024-10-02 PROCEDURE — 80053 COMPREHEN METABOLIC PANEL: CPT | Performed by: NURSE PRACTITIONER

## 2024-10-08 ENCOUNTER — OFFICE VISIT (OUTPATIENT)
Dept: GASTROENTEROLOGY | Facility: CLINIC | Age: 69
End: 2024-10-08
Payer: MEDICARE

## 2024-10-08 VITALS
SYSTOLIC BLOOD PRESSURE: 150 MMHG | DIASTOLIC BLOOD PRESSURE: 69 MMHG | WEIGHT: 165 LBS | BODY MASS INDEX: 29.23 KG/M2 | HEIGHT: 63 IN | HEART RATE: 67 BPM

## 2024-10-08 DIAGNOSIS — K70.30 ALCOHOLIC CIRRHOSIS OF LIVER WITHOUT ASCITES: Primary | ICD-10-CM

## 2024-10-08 DIAGNOSIS — K75.81 METABOLIC DYSFUNCTION-ASSOCIATED STEATOHEPATITIS (MASH): ICD-10-CM

## 2024-10-08 PROCEDURE — 82105 ALPHA-FETOPROTEIN SERUM: CPT

## 2024-10-08 NOTE — PROGRESS NOTES
Date of Consultation:  10/8/2024  Referring Physician: FIDELINA Marks    Chief Complaint  Cirrhosis    Diana Cassidy is a 69 y.o. female who presents today to [facility] for Cirrhosis.    HPI:   69-year-old female with PMH of DM2 on Mounjaro, alcoholic/MASH cirrhosis who presents today to establish care for cirrhosis.  Patient has followed with Dr. Kuo in the past.  Recent ultrasound performed in June 2023 was notable for stable heterogeneous borderline prominent liver size without focal or acute abnormality.  Most recent LFTs performed on 10/2/2024 was notable for LFTs WNL.  CBC with normal platelets as well.  Patient had colonoscopy performed on 1/31/2023 that was notable for diverticulitis and internal hemorrhoids.  Repeat colonoscopy was recommended in 5 years, January 2028.    Today, patient states that she has been doing well.  She denies abdominal distention, jaundice, rash, pruritus, abdominal pain, nausea, vomiting, hematemesis, melena, and other stigmata of liver disease.  She denies GERD, dysphagia, unintentional weight loss, changes in bowel habits, melena, and hematochezia.  Reports having 3-4 bowel movements per week and feels that she can evacuates her colon well.  She has no complaints today.           Previous History:   Past Medical History:   Diagnosis Date    Diabetes mellitus     Disease of thyroid gland     Elevated cholesterol     Fatty liver     Fatty liver disease, nonalcoholic     STATES SHE QUIT DRINKING    GERD (gastroesophageal reflux disease)     Hyperlipidemia NA    Don't remember    Hypertension 2007    Serum calcium elevated     Vitamin D deficiency 2020      Past Surgical History:   Procedure Laterality Date    CHOLECYSTECTOMY      COLONOSCOPY      ENDOSCOPY      GALLBLADDER SURGERY      PARATHYROIDECTOMY N/A 09/07/2022    Procedure: PARATHYROIDECTOMY FROZEN SECTION INTROP PTH;  Surgeon: Moi Garrett MD;  Location:  " [facility];  Service: ENT;  Laterality: N/A;    TUBAL ABDOMINAL LIGATION      UPPER GASTROINTESTINAL ENDOSCOPY        Social History     Socioeconomic History    Marital status:    Tobacco Use    Smoking status: Former     Current packs/day: 0.00     Average packs/day: 1.5 packs/day for 30.0 years (45.0 ttl pk-yrs)     Types: Cigarettes     Start date: 1982     Quit date: 2012     Years since quittin.4    Smokeless tobacco: Never   Vaping Use    Vaping status: Never Used   Substance and Sexual Activity    Alcohol use: Not Currently     Comment: QUIT ---DRANK TOO MUCH 2X A WEEK    Drug use: Never    Sexual activity: Not Currently     Birth control/protection: Tubal ligation, Surgical        Current Medications:  Current Outpatient Medications   Medication Sig Dispense Refill    Cholecalciferol (Vitamin D) 50 MCG ( UT) tablet Take 1 tablet by mouth 1 (One) Time Per Week.      citalopram (CeleXA) 40 MG tablet Take 1 tablet by mouth Daily.      ezetimibe (ZETIA) 10 MG tablet Take 1 tablet by mouth Daily.      felodipine (PLENDIL) 5 MG 24 hr tablet Take 1 tablet by mouth Daily.      furosemide (LASIX) 40 MG tablet Take 1 tablet by mouth Daily.      metoprolol tartrate (LOPRESSOR) 25 MG tablet Take 1 tablet by mouth 2 (Two) Times a Day.      Tirzepatide (MOUNJARO) 10 MG/0.5ML solution pen-injector pen Inject 0.5 mL under the skin into the appropriate area as directed 1 (One) Time Per Week. 6 mL 1     No current facility-administered medications for this visit.       Allergies:   No Known Allergies    Vitals:   /69   Pulse 67   Ht 160 cm (63\")   Wt 74.8 kg (165 lb)   LMP  (LMP Unknown)   BMI 29.23 kg/m²   Estimated body mass index is 29.23 kg/m² as calculated from the following:    Height as of this encounter: 160 cm (63\").    Weight as of this encounter: 74.8 kg (165 lb).    Diana Cassidy  reports that she quit smoking about 12 years ago. Her smoking use included cigarettes. She " started smoking about 42 years ago. She has a 45 pack-year smoking history. She has never used smokeless tobacco. I have educated her on the risk of diseases from using tobacco products such as cancer, COPD, and heart disease.       ROS:   Review of Systems   Constitutional: Negative.    HENT: Negative.     Respiratory: Negative.     Cardiovascular: Negative.    Gastrointestinal: Negative.    All other systems reviewed and are negative.       Physical Exam:   Physical Exam  Vitals reviewed.   Constitutional:       General: She is not in acute distress.     Appearance: Normal appearance. She is well-groomed. She is not toxic-appearing.   HENT:      Head: Normocephalic and atraumatic.      Mouth/Throat:      Mouth: Mucous membranes are moist.   Eyes:      Extraocular Movements: Extraocular movements intact.   Cardiovascular:      Rate and Rhythm: Normal rate and regular rhythm.      Heart sounds: Normal heart sounds. No murmur heard.  Pulmonary:      Effort: Pulmonary effort is normal. No respiratory distress.      Breath sounds: Normal breath sounds. No stridor. No wheezing or rales.   Abdominal:      General: Abdomen is flat. Bowel sounds are normal. There is no distension.      Palpations: Abdomen is soft. There is no mass.      Tenderness: There is no abdominal tenderness. There is no guarding or rebound.      Hernia: No hernia is present.   Skin:     General: Skin is warm.      Coloration: Skin is not jaundiced.      Findings: No rash.   Neurological:      Mental Status: She is alert and oriented to person, place, and time.   Psychiatric:         Mood and Affect: Mood and affect normal.         Speech: Speech normal.         Behavior: Behavior normal. Behavior is cooperative.         Thought Content: Thought content normal.          Lab Results:   Lab Results   Component Value Date    WBC 4.6 10/02/2024    HGB 13.1 10/02/2024    HCT 38.8 10/02/2024    MCV 91 10/02/2024    RDW 12.4 10/02/2024     10/02/2024        Lab Results   Component Value Date     10/02/2024    K 4.4 10/02/2024    CO2 26.7 10/02/2024     10/02/2024    BUN 21 10/02/2024    CREATININE 0.82 10/02/2024    GLUCOSE 131 (H) 10/02/2024    CALCIUM 10.0 10/02/2024    ALKPHOS 49 10/02/2024    AST 20 10/02/2024    ALT 10 10/02/2024    BILITOT 0.3 10/02/2024    ALBUMIN 4.2 10/02/2024    PROTEINTOT 6.7 10/02/2024    PHOS 2.5 01/19/2022       Pathology:        Endoscopy:        Imaging:  CT Chest Low Dose Cancer Screening WO 04/02/2024     No Images in the past 120 days found..    Results review: During today's encounter, all relevant clinical data has been reviewed.      Assessment and Plan    1. Alcoholic cirrhosis of liver without ascites (Primary)  2. [facility]   Patient meets [facility] criteria with history of diabetes and fatty infiltration of the liver on prior imaging in July 2022.  Educated patient on importance of weight loss, healthy diet, and daily exercise as this is essentially the only way to prevent progression of disease to cirrhosis. Encouraged patient to follow closely with PCP to develop an individualized plan for diet/exercise in order to promote healthy weight loss. Patient was advised that fatty infiltrate of the liver is not a benign condition and can lead to serious liver disease, even cirrhosis, possible liver transplant and hepatocellular carcinoma. Patient will have ultrasound of the liver at least annually and hepatic panel every 6 months for monitoring.  We will proceed with repeat liver ultrasound in [facility].  -     US Liver; Future  -     AFP Tumor Marker  Patient has not had an EGD in quite some time.  We will plan for EGD for further evaluation of esophageal varices secondary to cirrhosis.  -     Case Request; Standing  -     Obtain Informed Consent; Standing  -     Case Request      New Medications:   No orders of the defined types were placed in this encounter.      Discontinued Medications:   There are no discontinued  medications.     Visit Diagnoses:    ICD-10-CM ICD-9-CM   1. Alcoholic cirrhosis of liver without ascites  K70.30 571.2   2. Metabolic dysfunction-associated steatohepatitis (MASH)  K75.81 571.8            Follow Up:   No follow-ups on file.    The patient was in agreement with the plan and all questions were answered to patient's satisfaction.        This document has been electronically signed by Amira Luna PA-C   October 8, 2024 09:09 EDT    Dictated Utilizing Dragon Dictation: Part of this note may be an electronic transcription/translation of spoken language to printed text using the Dragon Dictation System.    CC:  Serene Powers, Chelsea Yepez APRN

## 2024-10-09 ENCOUNTER — TELEPHONE (OUTPATIENT)
Dept: GASTROENTEROLOGY | Facility: CLINIC | Age: 69
End: 2024-10-09
Payer: MEDICARE

## 2024-10-09 LAB — AFP-TM SERPL-MCNC: 5.6 NG/ML (ref 0–9.2)

## 2024-10-09 NOTE — TELEPHONE ENCOUNTER
----- Message from Amira Luna sent at 10/9/2024 12:52 PM EDT -----  Please let patient know that AFP tumor marker was within normal limits.

## 2024-10-30 ENCOUNTER — TELEPHONE (OUTPATIENT)
Dept: PHARMACY | Facility: HOSPITAL | Age: 69
End: 2024-10-30

## 2024-10-30 NOTE — TELEPHONE ENCOUNTER
Patient holding GLP1 prior to surgery on 11/13. She is going to come by this week to  a a sample of Ozempic 0.5 due to needing a lower dose after surgery then will continue on Ozempic 1mg the following month

## 2024-11-08 ENCOUNTER — HOSPITAL ENCOUNTER (OUTPATIENT)
Dept: ULTRASOUND IMAGING | Facility: HOSPITAL | Age: 69
Discharge: HOME | End: 2024-11-08
Payer: MEDICARE

## 2024-11-08 DIAGNOSIS — K70.30 ALCOHOLIC CIRRHOSIS OF LIVER WITHOUT ASCITES: ICD-10-CM

## 2024-11-08 PROCEDURE — 76705 ECHO EXAM OF ABDOMEN: CPT | Performed by: RADIOLOGY

## 2024-11-08 PROCEDURE — 76705 ECHO EXAM OF ABDOMEN: CPT

## 2024-11-13 ENCOUNTER — HOSPITAL ENCOUNTER (OUTPATIENT)
Facility: HOSPITAL | Age: 69
Setting detail: HOSPITAL OUTPATIENT SURGERY
Discharge: HOME | End: 2024-11-13
Attending: INTERNAL MEDICINE | Admitting: INTERNAL MEDICINE
Payer: MEDICARE

## 2024-11-13 PROCEDURE — G0463 HOSPITAL OUTPT CLINIC VISIT: HCPCS | Performed by: INTERNAL MEDICINE

## 2024-11-13 NOTE — NURSING NOTE
Patient said she had taco meat at 0900 this AM. Made Dr. Johnson aware; Dr. Johnson stated patient would have to wait 6 hours before preforming procedure.  Dr. Anna said to reschedule for a later date. Educated patient on the importance of being NPO and the risks.

## 2024-11-19 ENCOUNTER — TELEPHONE (OUTPATIENT)
Dept: GASTROENTEROLOGY | Facility: CLINIC | Age: 69
End: 2024-11-19
Payer: MEDICARE

## 2024-11-19 NOTE — TELEPHONE ENCOUNTER
Called to let patient know that recent liver ultrasound was notable for cirrhosis of the liver.  At this point, patient is compensated.  We will continue patient on Lasix.  She states that unfortunately she did miss her EGD appointment.  She has an appointment with me on 12/6, we we will reschedule EGD at that time.

## 2024-11-20 ENCOUNTER — TRANSCRIBE ORDERS (OUTPATIENT)
Dept: ADMINISTRATIVE | Facility: HOSPITAL | Age: 69
End: 2024-11-20
Payer: MEDICARE

## 2024-11-20 DIAGNOSIS — Z12.31 SCREENING MAMMOGRAM, ENCOUNTER FOR: Primary | ICD-10-CM

## 2024-11-26 ENCOUNTER — HOSPITAL ENCOUNTER (OUTPATIENT)
Dept: MAMMOGRAPHY | Facility: HOSPITAL | Age: 69
Discharge: HOME | End: 2024-11-26
Admitting: NURSE PRACTITIONER
Payer: MEDICARE

## 2024-11-26 DIAGNOSIS — Z12.31 SCREENING MAMMOGRAM, ENCOUNTER FOR: ICD-10-CM

## 2024-11-26 PROCEDURE — 77067 SCR MAMMO BI INCL CAD: CPT

## 2024-11-26 PROCEDURE — 77063 BREAST TOMOSYNTHESIS BI: CPT

## 2024-12-06 ENCOUNTER — OFFICE VISIT (OUTPATIENT)
Dept: GASTROENTEROLOGY | Facility: CLINIC | Age: 69
End: 2024-12-06
Payer: MEDICARE

## 2024-12-06 VITALS
SYSTOLIC BLOOD PRESSURE: 150 MMHG | HEIGHT: 63 IN | HEART RATE: 73 BPM | WEIGHT: 174.4 LBS | RESPIRATION RATE: 18 BRPM | DIASTOLIC BLOOD PRESSURE: 82 MMHG | BODY MASS INDEX: 30.9 KG/M2 | OXYGEN SATURATION: 95 %

## 2024-12-06 DIAGNOSIS — K75.81 METABOLIC DYSFUNCTION-ASSOCIATED STEATOHEPATITIS (MASH): ICD-10-CM

## 2024-12-06 DIAGNOSIS — K70.30 ALCOHOLIC CIRRHOSIS OF LIVER WITHOUT ASCITES: Primary | ICD-10-CM

## 2024-12-06 NOTE — PROGRESS NOTES
Chief Complaint  Alcoholic cirrhosis of liver without ascites    Diana Cassidy is a 69 y.o. female who presents today to [facility] for Alcoholic cirrhosis of liver without ascites.    HPI:   69-year-old female with PMH of DM2 on Mounjaro, alcoholic/MASH cirrhosis who presents today to establish care for cirrhosis.  Patient has followed with Dr. Kuo in the past.  Recent ultrasound performed in June 2023 was notable for stable heterogeneous borderline prominent liver size without focal or acute abnormality.  Most recent LFTs performed on 10/2/2024 was notable for LFTs WNL.  CBC with normal platelets as well.  Patient had colonoscopy performed on 1/31/2023 that was notable for diverticulitis and internal hemorrhoids.  Repeat colonoscopy was recommended in 5 years, January 2028.     Today, patient states that she has been doing well.  She denies abdominal distention, jaundice, rash, pruritus, abdominal pain, nausea, vomiting, hematemesis, melena, and other stigmata of liver disease.  She denies GERD, dysphagia, unintentional weight loss, changes in bowel habits, melena, and hematochezia.  Reports having 3-4 bowel movements per week and feels that she can evacuates her colon well.  She has no complaints today.  Liver ultrasound was notable for cirrhosis of the liver.  EGD was ordered.  However, patient missed his appointment.  We will plan to reschedule on next visit.      Interval History:    Today, patient presents for follow-up.  She denies any stigmata of liver disease.  She reports that she has been well.  No changes in her bowel habits or melena or hematochezia.  She denies unintentional weight loss, GERD, dysphagia, and abdominal pain.  Patient unfortunately had to cancel her last EGD.  Will plan to reschedule this today.      Previous History:   Past Medical History:   Diagnosis Date    Diabetes mellitus     Disease of thyroid gland     Elevated cholesterol      Fatty liver     Fatty liver disease, nonalcoholic     STATES SHE QUIT DRINKING    GERD (gastroesophageal reflux disease)     Hyperlipidemia NA    Don't remember    Hypertension     Serum calcium elevated     Vitamin D deficiency 2020      Past Surgical History:   Procedure Laterality Date    CHOLECYSTECTOMY      COLONOSCOPY      ENDOSCOPY      GALLBLADDER SURGERY      PARATHYROIDECTOMY N/A 2022    Procedure: PARATHYROIDECTOMY FROZEN SECTION INTROP PTH;  Surgeon: Moi Garrett MD;  Location: [facility];  Service: ENT;  Laterality: N/A;    TUBAL ABDOMINAL LIGATION      UPPER GASTROINTESTINAL ENDOSCOPY        Social History     Socioeconomic History    Marital status:    Tobacco Use    Smoking status: Former     Current packs/day: 0.00     Average packs/day: 1.5 packs/day for 30.0 years (45.0 ttl pk-yrs)     Types: Cigarettes     Start date: 1982     Quit date: 2012     Years since quittin.5    Smokeless tobacco: Never   Vaping Use    Vaping status: Never Used   Substance and Sexual Activity    Alcohol use: Not Currently     Comment: QUIT ---DRANK TOO MUCH 2X A WEEK    Drug use: Never    Sexual activity: Not Currently     Birth control/protection: Tubal ligation, Surgical        Current Medications:  Current Outpatient Medications   Medication Sig Dispense Refill    Cholecalciferol (Vitamin D) 50 MCG ( UT) tablet Take 1 tablet by mouth 1 (One) Time Per Week.      citalopram (CeleXA) 40 MG tablet Take 1 tablet by mouth Daily.      ezetimibe (ZETIA) 10 MG tablet Take 1 tablet by mouth Daily.      felodipine (PLENDIL) 5 MG 24 hr tablet Take 1 tablet by mouth Daily.      furosemide (LASIX) 40 MG tablet Take 1 tablet by mouth Daily.      metoprolol tartrate (LOPRESSOR) 25 MG tablet Take 1 tablet by mouth 2 (Two) Times a Day.      Semaglutide (OZEMPIC, 0.25 OR 0.5 MG/DOSE, SC) Inject 0.25 mg under the skin into the appropriate area as directed 1 (One) Time Per Week.       "Tirzepatide (MOUNJARO) 10 MG/0.5ML solution pen-injector pen Inject 0.5 mL under the skin into the appropriate area as directed 1 (One) Time Per Week. 6 mL 1     No current facility-administered medications for this visit.       Allergies:   No Known Allergies    Vitals:   /82 (BP Location: Right arm, Patient Position: Sitting, Cuff Size: Adult)   Pulse 73   Resp 18   Ht 160 cm (63\")   Wt 79.1 kg (174 lb 6.4 oz)   LMP  (LMP Unknown)   SpO2 95%   BMI 30.89 kg/m²   Estimated body mass index is 30.89 kg/m² as calculated from the following:    Height as of this encounter: 160 cm (63\").    Weight as of this encounter: 79.1 kg (174 lb 6.4 oz).    ROS:   Review of Systems   Constitutional: Negative.    HENT: Negative.     Respiratory: Negative.     Cardiovascular: Negative.    Gastrointestinal: Negative.    Neurological: Negative.    All other systems reviewed and are negative.       Physical Exam:   Physical Exam  Vitals reviewed.   Constitutional:       General: She is not in acute distress.     Appearance: She is well-groomed. She is not toxic-appearing.   HENT:      Head: Normocephalic and atraumatic.      Mouth/Throat:      Mouth: Mucous membranes are moist.   Eyes:      Extraocular Movements: Extraocular movements intact.   Cardiovascular:      Rate and Rhythm: Normal rate and regular rhythm.      Heart sounds: Normal heart sounds. No murmur heard.  Pulmonary:      Effort: Pulmonary effort is normal. No respiratory distress.      Breath sounds: Normal breath sounds. No stridor. No wheezing or rales.   Abdominal:      General: Bowel sounds are normal. There is no distension.      Palpations: Abdomen is soft. There is no mass.      Tenderness: There is no abdominal tenderness. There is no guarding or rebound.      Hernia: No hernia is present.   Skin:     General: Skin is warm.      Coloration: Skin is not jaundiced.      Findings: No rash.   Neurological:      Mental Status: She is alert and oriented to " person, place, and time.   Psychiatric:         Mood and Affect: Mood and affect normal.         Speech: Speech normal.         Behavior: Behavior is cooperative.          Lab Results:   Lab Results   Component Value Date    WBC 4.6 10/02/2024    HGB 13.1 10/02/2024    HCT 38.8 10/02/2024    MCV 91 10/02/2024    RDW 12.4 10/02/2024     10/02/2024       Lab Results   Component Value Date     10/02/2024    K 4.4 10/02/2024    CO2 26.7 10/02/2024     10/02/2024    BUN 21 10/02/2024    CREATININE 0.82 10/02/2024    GLUCOSE 131 (H) 10/02/2024    CALCIUM 10.0 10/02/2024    ALKPHOS 49 10/02/2024    AST 20 10/02/2024    ALT 10 10/02/2024    BILITOT 0.3 10/02/2024    ALBUMIN 4.2 10/02/2024    PROTEINTOT 6.7 10/02/2024    PHOS 2.5 01/19/2022       Pathology:        Endoscopy:        Imaging:   Mammo Screening Digital Tomosynthesis Bilateral With CAD    Result Date: 11/26/2024    Increasing microcalcifications of the left upper breast should undergo spot compression imaging.  ASSESSMENT:   BI-RADS 0: Incomplete - Need additional imaging evaluation .   This report was finalized on 11/26/2024 10:49 AM by Dr. Filiberto Thomas MD.      US Liver    Result Date: 11/8/2024  Findings consistent with cirrhosis.     This report was finalized on 11/8/2024 9:38 AM by Evin Lees M.D..         Results review: During today's encounter, all relevant clinical data has been reviewed.      Assessment and Plan  1. Alcoholic cirrhosis of liver without ascites (Primary)  2. Metabolic dysfunction-associated steatohepatitis (MASH)  Educated patient on importance of weight loss, healthy diet, and daily exercise as this is essentially the only way to prevent progression of disease to cirrhosis. Encouraged patient to follow closely with PCP to develop an individualized plan for diet/exercise in order to promote healthy weight loss. Patient was advised that fatty infiltrate of the liver is not a benign condition and can lead to  serious liver disease, even cirrhosis, possible liver transplant and hepatocellular carcinoma. Patient will have ultrasound of the liver at least annually and hepatic panel every 6 months for monitoring.  Patient is compensated currently.  She is educated to abstain from alcohol.  EGD has been rescheduled.      New Medications:   No orders of the defined types were placed in this encounter.      Discontinued Medications:   There are no discontinued medications.     Visit Diagnoses:    ICD-10-CM ICD-9-CM   1. Alcoholic cirrhosis of liver without ascites  K70.30 571.2   2. Metabolic dysfunction-associated steatohepatitis (MASH)  K75.81 571.8            Follow Up:   No follow-ups on file.    The patient was in agreement with the plan and all questions were answered to patient's satisfaction.        This document has been electronically signed by Amira Luna PA-C   December 6, 2024 09:47 EST    Dictated Utilizing Dragon Dictation: Part of this note may be an electronic transcription/translation of spoken language to printed text using the Dragon Dictation System.    CC:  No ref. provider found  Chelsea Rodriguez, FIDELINA

## 2024-12-11 ENCOUNTER — HOSPITAL ENCOUNTER (OUTPATIENT)
Dept: MAMMOGRAPHY | Facility: HOSPITAL | Age: 69
Discharge: HOME | End: 2024-12-11
Admitting: RADIOLOGY
Payer: MEDICARE

## 2024-12-11 DIAGNOSIS — R92.8 ABNORMAL MAMMOGRAM: ICD-10-CM

## 2024-12-11 PROCEDURE — 77065 DX MAMMO INCL CAD UNI: CPT

## 2025-01-16 ENCOUNTER — ANESTHESIA EVENT (OUTPATIENT)
Dept: PERIOP | Facility: HOSPITAL | Age: 70
End: 2025-01-16
Payer: MEDICARE

## 2025-01-16 ENCOUNTER — HOSPITAL ENCOUNTER (OUTPATIENT)
Facility: HOSPITAL | Age: 70
Setting detail: HOSPITAL OUTPATIENT SURGERY
Discharge: HOME | End: 2025-01-16
Attending: INTERNAL MEDICINE | Admitting: INTERNAL MEDICINE
Payer: MEDICARE

## 2025-01-16 ENCOUNTER — ANESTHESIA (OUTPATIENT)
Dept: PERIOP | Facility: HOSPITAL | Age: 70
End: 2025-01-16
Payer: MEDICARE

## 2025-01-16 VITALS
SYSTOLIC BLOOD PRESSURE: 137 MMHG | HEIGHT: 63 IN | TEMPERATURE: 97.4 F | RESPIRATION RATE: 18 BRPM | DIASTOLIC BLOOD PRESSURE: 71 MMHG | BODY MASS INDEX: 30.12 KG/M2 | HEART RATE: 67 BPM | WEIGHT: 170 LBS | OXYGEN SATURATION: 93 %

## 2025-01-16 DIAGNOSIS — K70.30 ALCOHOLIC CIRRHOSIS OF LIVER WITHOUT ASCITES: ICD-10-CM

## 2025-01-16 DIAGNOSIS — K75.81 METABOLIC DYSFUNCTION-ASSOCIATED STEATOHEPATITIS (MASH): ICD-10-CM

## 2025-01-16 LAB — GLUCOSE BLDC GLUCOMTR-MCNC: 211 MG/DL (ref 70–130)

## 2025-01-16 PROCEDURE — 43239 EGD BIOPSY SINGLE/MULTIPLE: CPT | Performed by: INTERNAL MEDICINE

## 2025-01-16 PROCEDURE — 25010000002 LIDOCAINE PF 2% 2 % SOLUTION: Performed by: NURSE ANESTHETIST, CERTIFIED REGISTERED

## 2025-01-16 PROCEDURE — 25010000002 PROPOFOL 10 MG/ML EMULSION: Performed by: NURSE ANESTHETIST, CERTIFIED REGISTERED

## 2025-01-16 PROCEDURE — 82948 REAGENT STRIP/BLOOD GLUCOSE: CPT

## 2025-01-16 RX ORDER — LIDOCAINE HYDROCHLORIDE 20 MG/ML
INJECTION, SOLUTION EPIDURAL; INFILTRATION; INTRACAUDAL; PERINEURAL AS NEEDED
Status: DISCONTINUED | OUTPATIENT
Start: 2025-01-16 | End: 2025-01-16 | Stop reason: SURG

## 2025-01-16 RX ORDER — PROPOFOL 10 MG/ML
VIAL (ML) INTRAVENOUS AS NEEDED
Status: DISCONTINUED | OUTPATIENT
Start: 2025-01-16 | End: 2025-01-16 | Stop reason: SURG

## 2025-01-16 RX ORDER — SODIUM CHLORIDE 0.9 % (FLUSH) 0.9 %
10 SYRINGE (ML) INJECTION EVERY 12 HOURS SCHEDULED
Status: DISCONTINUED | OUTPATIENT
Start: 2025-01-16 | End: 2025-01-16 | Stop reason: HOSPADM

## 2025-01-16 RX ORDER — FENTANYL CITRATE 50 UG/ML
50 INJECTION, SOLUTION INTRAMUSCULAR; INTRAVENOUS
Status: DISCONTINUED | OUTPATIENT
Start: 2025-01-16 | End: 2025-01-16 | Stop reason: HOSPADM

## 2025-01-16 RX ORDER — SODIUM CHLORIDE 9 MG/ML
40 INJECTION, SOLUTION INTRAVENOUS AS NEEDED
Status: DISCONTINUED | OUTPATIENT
Start: 2025-01-16 | End: 2025-01-16 | Stop reason: HOSPADM

## 2025-01-16 RX ORDER — PANTOPRAZOLE SODIUM 40 MG/1
40 TABLET, DELAYED RELEASE ORAL DAILY
Qty: 90 TABLET | Refills: 3 | Status: SHIPPED | OUTPATIENT
Start: 2025-01-16

## 2025-01-16 RX ORDER — MEPERIDINE HYDROCHLORIDE 25 MG/ML
12.5 INJECTION INTRAMUSCULAR; INTRAVENOUS; SUBCUTANEOUS
Status: DISCONTINUED | OUTPATIENT
Start: 2025-01-16 | End: 2025-01-16 | Stop reason: HOSPADM

## 2025-01-16 RX ORDER — SODIUM CHLORIDE 0.9 % (FLUSH) 0.9 %
10 SYRINGE (ML) INJECTION AS NEEDED
Status: DISCONTINUED | OUTPATIENT
Start: 2025-01-16 | End: 2025-01-16 | Stop reason: HOSPADM

## 2025-01-16 RX ORDER — ONDANSETRON 2 MG/ML
4 INJECTION INTRAMUSCULAR; INTRAVENOUS AS NEEDED
Status: DISCONTINUED | OUTPATIENT
Start: 2025-01-16 | End: 2025-01-16 | Stop reason: HOSPADM

## 2025-01-16 RX ORDER — IPRATROPIUM BROMIDE AND ALBUTEROL SULFATE 2.5; .5 MG/3ML; MG/3ML
3 SOLUTION RESPIRATORY (INHALATION) ONCE AS NEEDED
Status: DISCONTINUED | OUTPATIENT
Start: 2025-01-16 | End: 2025-01-16 | Stop reason: HOSPADM

## 2025-01-16 RX ORDER — OXYCODONE AND ACETAMINOPHEN 5; 325 MG/1; MG/1
1 TABLET ORAL ONCE AS NEEDED
Status: DISCONTINUED | OUTPATIENT
Start: 2025-01-16 | End: 2025-01-16 | Stop reason: HOSPADM

## 2025-01-16 RX ORDER — MIDAZOLAM HYDROCHLORIDE 1 MG/ML
0.5 INJECTION, SOLUTION INTRAMUSCULAR; INTRAVENOUS
Status: DISCONTINUED | OUTPATIENT
Start: 2025-01-16 | End: 2025-01-16 | Stop reason: HOSPADM

## 2025-01-16 RX ADMIN — PROPOFOL 160 MCG/KG/MIN: 10 INJECTION, EMULSION INTRAVENOUS at 09:18

## 2025-01-16 RX ADMIN — PROPOFOL 80 MG: 10 INJECTION, EMULSION INTRAVENOUS at 09:17

## 2025-01-16 RX ADMIN — LIDOCAINE HYDROCHLORIDE 60 MG: 20 INJECTION, SOLUTION EPIDURAL; INFILTRATION; INTRACAUDAL; PERINEURAL at 09:17

## 2025-01-16 NOTE — ANESTHESIA PREPROCEDURE EVALUATION
Anesthesia Evaluation     Patient summary reviewed and Nursing notes reviewed   no history of anesthetic complications:   NPO Solid Status: > 8 hours  NPO Liquid Status: > 8 hours           Airway   Mallampati: II  TM distance: >3 FB  Neck ROM: full  No difficulty expected  Dental    (+) poor dentition and partials        Pulmonary - normal exam    breath sounds clear to auscultation  (+) a smoker Former,  Cardiovascular - normal exam    ECG reviewed  Rhythm: regular  Rate: normal    (+) hypertension, hyperlipidemia    ROS comment: 9/6/22 EKG    Normal sinus rhythm  Normal ECG  No previous ECGs available  Confirmed by Tammy Wells ([id number]) on 9/6/2022 3:05:33 PM    Neuro/Psych- negative ROS  GI/Hepatic/Renal/Endo    (+) obesity, GERD, hepatitis, liver disease fatty liver disease, diabetes mellitus type 2 poorly controlled using insulin, thyroid problem hypothyroidism    ROS Comment: Hyperparathyroidism    Musculoskeletal     Abdominal  - normal exam    Abdomen: soft.   Substance History - negative use  (-) alcohol use, drug use     OB/GYN negative ob/gyn ROS         Other   arthritis,     ROS/Med Hx Other:   Normal sinus rhythm  Normal ECG  When compared with ECG of 06-NOV-2023 10:37, (Unconfirmed)  Previous ECG has undetermined rhythm, needs review  Confirmed by Lauren Guzman ([id number]) on 11/6/2023 12:29:46 PM                     Anesthesia Plan    ASA 3     general     intravenous induction     Anesthetic plan, risks, benefits, and alternatives have been provided, discussed and informed consent has been obtained with: patient.  Pre-procedure education provided  Use of blood products discussed with  Consented to blood products.    Plan discussed with CRNA.      CODE STATUS:      FULL      Lab Results   Component Value Date    WBC 4.6 10/02/2024    HGB 13.1 10/02/2024    HCT 38.8 10/02/2024    MCV 91 10/02/2024     10/02/2024     Lab Results   Component Value Date    GLUCOSE 131 (H) 10/02/2024    BUN 21  10/02/2024    CREATININE 0.82 10/02/2024    BCR 25.6 (H) 10/02/2024    K 4.4 10/02/2024    CO2 26.7 10/02/2024    CALCIUM 10.0 10/02/2024    PROTENTOTREF 6.6 02/08/2023    ALBUMIN 4.2 10/02/2024    LABIL2 2.1 02/08/2023    AST 20 10/02/2024    ALT 10 10/02/2024

## 2025-01-16 NOTE — ANESTHESIA POSTPROCEDURE EVALUATION
Patient: Diana Cassidy    Procedure Summary       Date: 01/16/25 Room / Location: [facility] OR [address] [place] [address] COR OR    Anesthesia Start: 0914 Anesthesia Stop: 0928    Procedure: ESOPHAGOGASTRODUODENOSCOPY WITH BIOPSY (Esophagus) Diagnosis:       Alcoholic cirrhosis of liver without ascites      Metabolic dysfunction-associated steatohepatitis (MASH)      (Alcoholic cirrhosis of liver without ascites [K70.30])      (Metabolic dysfunction-associated steatohepatitis (MASH) [K75.81])    Surgeons: Dana Cohen MD Provider: Ronak Johnson MD    Anesthesia Type: general ASA Status: 3            Anesthesia Type: general    Vitals  Vitals Value Taken Time   /71 01/16/25 0950   Temp 97.4 °F (36.3 °C) 01/16/25 0930   Pulse 68 01/16/25 0954   Resp 18 01/16/25 0940   SpO2 93 % 01/16/25 0954   Vitals shown include unfiled device data.        Post Anesthesia Care and Evaluation    Patient location during evaluation: PHASE II  Patient participation: complete - patient participated  Level of consciousness: awake and alert  Pain score: 1  Pain management: adequate    Airway patency: patent  Anesthetic complications: No anesthetic complications  PONV Status: controlled  Cardiovascular status: acceptable  Respiratory status: acceptable  Hydration status: acceptable

## 2025-01-16 NOTE — H&P
[facility] HOSPITALIST HISTORY AND PHYSICAL    Patient Identification:  Name:  Diana Cassidy  Age:  69 y.o.  Sex:  female  :  1955  MRN:  [id number]   Visit Number:  [id number]  Primary Care Physician:  Chelsea Rodriguez APRN     Chief complaint: Cirrhosis     History of presenting illness: Ms. Cassidy is a  69 y.o. female with alcoholic/MASH cirrhosis who presents today for EGD for surveillance of esophageal varices.  Patient reports she previously followed with Dr. Kuo and underwent EGD in ~ which was negative for esophageal varices.  At present, clinically she is doing well.  She denies having hematemesis or melena.  She reports very occasional acid reflux depending on dietary choices.  She is not on PPI therapy.  She denies dysphagia or epigastric pain.  She has no specific complaints today.    Review of Systems   Constitutional: Negative.    HENT: Negative.     Eyes: Negative.    Respiratory: Negative.     Cardiovascular: Negative.    Gastrointestinal: Negative.    Endocrine: Negative.    Genitourinary: Negative.    Musculoskeletal: Negative.    Allergic/Immunologic: Negative.    Neurological: Negative.    Hematological: Negative.         Past Medical History:   Diagnosis Date    Arthritis     Diabetes mellitus     Disease of thyroid gland     Elevated cholesterol     Fatty liver     Fatty liver disease, nonalcoholic     STATES SHE QUIT DRINKING    GERD (gastroesophageal reflux disease)     Hyperlipidemia NA    Don't remember    Hypertension     Serum calcium elevated     Vitamin D deficiency      Past Surgical History:   Procedure Laterality Date    CHOLECYSTECTOMY      COLONOSCOPY      ENDOSCOPY      GALLBLADDER SURGERY      HEMORROIDECTOMY      PARATHYROIDECTOMY N/A 2022    Procedure: PARATHYROIDECTOMY FROZEN SECTION INTROP PTH;  Surgeon: Moi Garrett MD;  Location: [facility];  Service: ENT;  Laterality: N/A;    TUBAL ABDOMINAL LIGATION      UPPER  GASTROINTESTINAL ENDOSCOPY       Family History   Problem Relation Age of Onset    Cancer Mother         Lung cancer    Cancer Father         Pancreatic/ Lung Cancer    Cancer Sister         Lymphoma/bladder    Cancer Sister         Bladder Cancer    Breast cancer Neg Hx      Social History     Socioeconomic History    Marital status:    Tobacco Use    Smoking status: Former     Current packs/day: 0.00     Average packs/day: 1.5 packs/day for 30.0 years (45.0 ttl pk-yrs)     Types: Cigarettes     Start date: 1982     Quit date: 2012     Years since quittin.6    Smokeless tobacco: Never   Vaping Use    Vaping status: Never Used   Substance and Sexual Activity    Alcohol use: Not Currently     Comment: QUIT ---DRANK TOO MUCH 2X A WEEK    Drug use: Never    Sexual activity: Not Currently     Birth control/protection: Tubal ligation, Surgical       Allergies:  Patient has no known allergies.    Prior to Admission Medications       Prescriptions Last Dose Informant Patient Reported? Taking?    Cholecalciferol (Vitamin D) 50 MCG (2000 UT) tablet Past Week  Yes Yes    Take 1 tablet by mouth 1 (One) Time Per Week.    citalopram (CeleXA) 40 MG tablet 1/15/2025  Yes Yes    Take 1 tablet by mouth Daily.    ezetimibe (ZETIA) 10 MG tablet 1/15/2025  Yes Yes    Take 1 tablet by mouth Daily.    felodipine (PLENDIL) 5 MG 24 hr tablet 1/15/2025  Yes Yes    Take 1 tablet by mouth Daily.    furosemide (LASIX) 40 MG tablet 1/15/2025  Yes Yes    Take 1 tablet by mouth Daily.    metoprolol tartrate (LOPRESSOR) 25 MG tablet 2025  Yes Yes    Take 1 tablet by mouth 2 (Two) Times a Day.    Semaglutide (OZEMPIC, 0.25 OR 0.5 MG/DOSE, SC) Past Month  Yes Yes    Inject 0.25 mg under the skin into the appropriate area as directed 1 (One) Time Per Week.          Vital Signs:     Vitals:    25 0826   BP: 148/72   BP Location: Left arm   Patient Position: Lying   Pulse: 72   Resp: 18   Temp: 97 °F (36.1 °C)  "  TempSrc: Temporal   SpO2: 96%   Weight: 77.1 kg (170 lb)   Height: 160 cm (63\")      Body mass index is 30.11 kg/m².    Physical Exam:  Constitutional:  Alert and oriented. Well developed and well nourished, in no acute distress.  HENT:  Head: Normocephalic and atraumatic.  Mouth:  Moist mucous membranes.  OP clear, mmm  Eyes:  Conjunctivae and EOM are normal.  Pupils are equal, round, and reactive to light.  No scleral icterus.  Neck:  Neck supple.  No JVD present.    Cardiovascular:  RRR, no MRG.  Pulmonary/Chest:  CTAB, unlabored.   Abdominal:  Soft.  Bowel sounds are normal.  No distension and no tenderness.   Musculoskeletal:  No edema, no tenderness, and no deformity.   Neurological:  MS as above, grossly nonfocal exam     Assessment and Plan:  Proceed with EGD for surveillance of esophageal varices.     Xi Caba, FIDELINA  01/16/25  08:26 EST  "

## 2025-01-21 LAB — REF LAB TEST METHOD: NORMAL

## 2025-01-21 NOTE — PROGRESS NOTES
At the time of your recent upper endoscopy, biopsies were taken of the esophagus which were benign.  The biopsies of the stomach were negative for h. pylori. Begin Protonix 40mg daily. Please keep your follow up appointment.

## 2025-01-23 ENCOUNTER — TELEPHONE (OUTPATIENT)
Dept: ENDOCRINOLOGY | Facility: CLINIC | Age: 70
End: 2025-01-23
Payer: MEDICARE

## 2025-01-23 NOTE — TELEPHONE ENCOUNTER
Please see if she can come in to be seen.  IT has been a while since she has been seen and I need to see what her glucoses are doing prior to changing anything.  You can work her in when she can come in the next couple weeks.

## 2025-01-23 NOTE — TELEPHONE ENCOUNTER
Patient is having fasting sugars above 200. She did have to stop her Ozempic 1 mg for a couple of weeks due to having surgery last week, but patient reports that her sugars have been higher even before she had to stop.

## 2025-01-29 ENCOUNTER — OFFICE VISIT (OUTPATIENT)
Dept: ENDOCRINOLOGY | Facility: CLINIC | Age: 70
End: 2025-01-29
Payer: MEDICARE

## 2025-01-29 ENCOUNTER — SPECIALTY PHARMACY (OUTPATIENT)
Dept: ENDOCRINOLOGY | Facility: CLINIC | Age: 70
End: 2025-01-29
Payer: MEDICARE

## 2025-01-29 VITALS
WEIGHT: 175.8 LBS | OXYGEN SATURATION: 94 % | SYSTOLIC BLOOD PRESSURE: 152 MMHG | DIASTOLIC BLOOD PRESSURE: 72 MMHG | BODY MASS INDEX: 31.14 KG/M2 | HEART RATE: 80 BPM

## 2025-01-29 DIAGNOSIS — E11.65 TYPE 2 DIABETES MELLITUS WITH HYPERGLYCEMIA, WITH LONG-TERM CURRENT USE OF INSULIN: Primary | ICD-10-CM

## 2025-01-29 DIAGNOSIS — Z79.4 TYPE 2 DIABETES MELLITUS WITH HYPERGLYCEMIA, WITH LONG-TERM CURRENT USE OF INSULIN: Primary | ICD-10-CM

## 2025-01-29 LAB
EXPIRATION DATE: ABNORMAL
EXPIRATION DATE: ABNORMAL
GLUCOSE BLDC GLUCOMTR-MCNC: 245 MG/DL (ref 70–130)
HBA1C MFR BLD: 8 % (ref 4.5–5.7)
Lab: ABNORMAL
Lab: ABNORMAL

## 2025-01-29 RX ORDER — SEMAGLUTIDE 1.34 MG/ML
1 INJECTION, SOLUTION SUBCUTANEOUS WEEKLY
COMMUNITY

## 2025-01-29 NOTE — ASSESSMENT & PLAN NOTE
-Diabetes is at goal with A1c 8.0.  -Discussed dietary and exercise guidelines. Discussed limiting carbs and sugars to reduce her fasting glucoses.  -Discussed importance of yearly eye exams.  -Discussed importance of checking BG's regularly.  -Increase Ozempic 2 mg weekly.  Patient has no personal history of pancreatitis, no family history of MEN syndrome or medullary thyroid cancer. Possible side effects including nausea, bloating, other GI upset and rarely pancreatitis were discussed. She was advised to call the office with any symptoms or concerns.   -Start Jardiance 10 mg daily.   Discussed the medication's MOA and that it may cause more frequent urination particularly upon starting the medication  Take one tablet in the morning with or without food    Encouraged to drink plenty of water as to not get dehydrated especially in warmer weather or with activity  Also discussed there may be an increased incidence of UTIs or yeast infections and to monitor for signs/symptoms.  -S/S hypoglycemia reviewed with Rule of 15's advised.  -Follow-up in 3 months.

## 2025-01-29 NOTE — PROGRESS NOTES
Chief Complaint   Patient presents with    Diabetes     F/u t2dm        Diana Cassidy is a 69 y.o. female had concerns including Diabetes (F/u t2dm).    T2DM    She had to change her GLP-1 from Mounjaro to Ozempic due to increased cost and being able to get Ozempic through manufacture patient assistance.  She reports that since changing from Mounjaro to Ozempic she has noted increased blood sugars.  She was off of her Ozempic for a couple weeks at a time on 2 different occasions due to surgical procedure.  She has been back on her dose for the last 2 weeks and notes that her blood sugars are typically anywhere from 180-250.  She reports that she is tolerating the medication well without any adverse side effects.  She denies any hypoglycemic events.    T2DM:  Diabetes was diagnosed .  Complications include none.  Last ophtho exam was , Dr. Morillo.  Current medications for diabetes include Ozempic 1 mg weekly.   She was having hypos and stopped her insulin and Farxiga and this has resolved.  She checks her blood sugar one times per day.   Hypos: none  FSB    Diet: She eats what she wants, she is not limiting much.    The following portions of the patient's history were reviewed and updated as appropriate: allergies, current medications, past family history, past medical history, past social history, past surgical history and problem list.    Past Medical History:   Diagnosis Date    Arthritis     Diabetes mellitus     Disease of thyroid gland     Elevated cholesterol     Fatty liver     Fatty liver disease, nonalcoholic     STATES SHE QUIT DRINKING    GERD (gastroesophageal reflux disease)     Hyperlipidemia NA    Don't remember    Hypertension 2007    Serum calcium elevated     Vitamin D deficiency      Past Surgical History:   Procedure Laterality Date    CHOLECYSTECTOMY      COLONOSCOPY      ENDOSCOPY      ENDOSCOPY N/A 2025    Procedure: ESOPHAGOGASTRODUODENOSCOPY WITH BIOPSY;  Surgeon:  Dana Cohen MD;  Location: [facility] OR;  Service: Gastroenterology;  Laterality: N/A;    GALLBLADDER SURGERY      HEMORROIDECTOMY      PARATHYROIDECTOMY N/A 2022    Procedure: PARATHYROIDECTOMY FROZEN SECTION INTROP PTH;  Surgeon: Moi Garrett MD;  Location: [facility] OR;  Service: ENT;  Laterality: N/A;    TUBAL ABDOMINAL LIGATION      UPPER GASTROINTESTINAL ENDOSCOPY        Family History   Problem Relation Age of Onset    Cancer Mother         Lung cancer    Cancer Father         Pancreatic/ Lung Cancer    Cancer Sister         Lymphoma/bladder    Cancer Sister         Bladder Cancer    Breast cancer Neg Hx       Social History     Socioeconomic History    Marital status:    Tobacco Use    Smoking status: Former     Current packs/day: 0.00     Average packs/day: 1.5 packs/day for 30.0 years (45.0 ttl pk-yrs)     Types: Cigarettes     Start date: 1982     Quit date: 2012     Years since quittin.7     Passive exposure: Past    Smokeless tobacco: Never   Vaping Use    Vaping status: Never Used   Substance and Sexual Activity    Alcohol use: Not Currently     Comment: QUIT ---DRANK TOO MUCH 2X A WEEK    Drug use: Never    Sexual activity: Not Currently     Birth control/protection: Tubal ligation, Surgical      No Known Allergies   Current Outpatient Medications on File Prior to Visit   Medication Sig Dispense Refill    Cholecalciferol (Vitamin D) 50 MCG ( UT) tablet Take 1 tablet by mouth 1 (One) Time Per Week.      citalopram (CeleXA) 40 MG tablet Take 1 tablet by mouth Daily.      ezetimibe (ZETIA) 10 MG tablet Take 1 tablet by mouth Daily.      felodipine (PLENDIL) 5 MG 24 hr tablet Take 1 tablet by mouth Daily.      furosemide (LASIX) 40 MG tablet Take 1 tablet by mouth Daily.      metoprolol tartrate (LOPRESSOR) 25 MG tablet Take 1 tablet by mouth 2 (Two) Times a Day.      pantoprazole (PROTONIX) 40 MG EC tablet Take 1 tablet by mouth Daily. 90 tablet 3     [DISCONTINUED] Semaglutide (OZEMPIC, 0.25 OR 0.5 MG/DOSE, SC) Inject 0.25 mg under the skin into the appropriate area as directed 1 (One) Time Per Week.       No current facility-administered medications on file prior to visit.      Review of Systems   Constitutional:  Positive for fatigue. Negative for activity change, appetite change, chills, diaphoresis, fever, unexpected weight gain and unexpected weight loss.   Eyes:  Positive for blurred vision and visual disturbance.   Cardiovascular:  Negative for palpitations.   Gastrointestinal:  Positive for diarrhea.   Endocrine: Negative for cold intolerance, heat intolerance, polydipsia, polyphagia and polyuria.   Musculoskeletal: Negative.    Skin:  Positive for dry skin.   Neurological:  Negative for dizziness, weakness, light-headedness, numbness, headache, memory problem and confusion.   Hematological:  Bruises/bleeds easily.   Psychiatric/Behavioral:  Negative for agitation and sleep disturbance. The patient is not nervous/anxious.    All other systems reviewed and are negative.    /72 (BP Location: Right arm, Patient Position: Sitting, Cuff Size: Adult)   Pulse 80   Wt 79.7 kg (175 lb 12.8 oz)   LMP  (LMP Unknown)   SpO2 94%   BMI 31.14 kg/m²      Physical Exam  Vitals reviewed.   Constitutional:       Appearance: Normal appearance.   Eyes:      Extraocular Movements: Extraocular movements intact.   Cardiovascular:      Rate and Rhythm: Normal rate.   Pulmonary:      Effort: Pulmonary effort is normal.   Skin:     General: Skin is warm.   Neurological:      Mental Status: She is alert and oriented to person, place, and time.   Psychiatric:         Mood and Affect: Mood normal.         Behavior: Behavior normal.         Thought Content: Thought content normal.         Judgment: Judgment normal.        Labs/Imaging  CMP:  Lab Results   Component Value Date    Glucose 245 (A) 01/29/2025    BUN 21 10/02/2024    BUN/Creatinine Ratio 25.6 (H) 10/02/2024     "Creatinine 0.82 10/02/2024    Creatinine, Urine 324.1 09/24/2024    CO2 26.7 10/02/2024    Calcium 10.0 10/02/2024    Calcium, 24H Urine 465.8 (H) 01/20/2022    Calcium, Urine 54.8 01/20/2022    Albumin 4.2 10/02/2024    AST (SGOT) 20 10/02/2024    ALT (SGPT) 10 10/02/2024     Lipid Panel:  No results found for: \"CHOL\", \"TRIG\", \"HDL\", \"VLDL\", \"LDL\"  HbA1c:  Lab Results   Component Value Date    HGBA1C 8.0 (A) 01/29/2025   10/2023: 5.9    Glucose:  Lab Results   Component Value Date    POCGLU 245 (A) 01/29/2025     TSH:  Lab Results   Component Value Date    TSH 2.400 01/19/2022       Assessment and Plan    Diagnoses and all orders for this visit:    1. Type 2 diabetes mellitus with hyperglycemia, with long-term current use of insulin (Primary)  Assessment & Plan:  -Diabetes is at goal with A1c 8.0.  -Discussed dietary and exercise guidelines. Discussed limiting carbs and sugars to reduce her fasting glucoses.  -Discussed importance of yearly eye exams.  -Discussed importance of checking BG's regularly.  -Increase Ozempic 2 mg weekly.  Patient has no personal history of pancreatitis, no family history of MEN syndrome or medullary thyroid cancer. Possible side effects including nausea, bloating, other GI upset and rarely pancreatitis were discussed. She was advised to call the office with any symptoms or concerns.   -Start Jardiance 10 mg daily.   Discussed the medication's MOA and that it may cause more frequent urination particularly upon starting the medication  Take one tablet in the morning with or without food    Encouraged to drink plenty of water as to not get dehydrated especially in warmer weather or with activity  Also discussed there may be an increased incidence of UTIs or yeast infections and to monitor for signs/symptoms.  -S/S hypoglycemia reviewed with Rule of 15's advised.  -Follow-up in 3 months.    Orders:  -     POC Glucose, Blood  -     POC Glycosylated Hemoglobin (Hb A1C)         Return in about " 3 months (around 4/29/2025) for Follow-up appointment, A1C. The patient was instructed to contact the clinic with any interval questions or concerns.    This document has been electronically signed by FIDELINA Marks  January 29, 2025 09:39 EST  Endocrinology    Please note that portions of this document were completed with a voice recognition program. Efforts were made to edit the dictations, but occasionally words are mis-transcribed.

## 2025-01-29 NOTE — PROGRESS NOTES
"   Specialty Pharmacy Patient Management Program  Endocrinology Reassessment     Diana Cassidy is a 69 y.o. female with Type 2 Diabetes seen by an Endocrinology provider and enrolled in the Endocrinology Patient Management program offered by [facility]. A follow-up outreach was conducted, including assessment of continued therapy appropriateness, medication adherence, and side effect incidence and management for Ozempic and Jardiance.    Patient is currently taking Ozempic 1 mg weekly. She reports tolerating medications well with no side effects. Patient checks BG a few times per week and reports that her BG has been running \"higher\" as of late.  She denies BG < 70. Of note, patient did recently have to stop Ozempic for 2 weeks in preparation for EGD procedure. She re-started at a lower dose via samples and has titrated back to 1 mg weekly. Pt reports she curently has 2 weeks of Ozempic left from PAP.      Patient denies personal/family history of thyroid cancer, denies issues with pancreas/pancreatitis, and denies issues with recurrent UTI/yeast infections. In the past she was misdiagnosed with cirrhosis of the liver. Patient states that the specialist she sees now informed her of the misdiagnosis. He routinely follows her labs.     In the past, the patient has tried:     Drug Dose Reason for Discontinuation Notes   Tradjenta  5 mg Switched to Mounjaro    Farxiga  Controlled with Mounjaro    Tresiba  Controlled with Mounjaro       Changes to Insurance Coverage or Financial Support  None      Relevant Past Medical History and Comorbidities  Relevant medical history and concomitant health conditions were discussed with the patient. The patient's chart has been reviewed for relevant past medical history and comorbid health conditions and updated as necessary.   Past Medical History:   Diagnosis Date    Arthritis     Diabetes mellitus     Disease of thyroid gland     Elevated cholesterol     " Fatty liver     Fatty liver disease, nonalcoholic     STATES SHE QUIT DRINKING    GERD (gastroesophageal reflux disease)     Hyperlipidemia NA    Don't remember    Hypertension 2007    Serum calcium elevated     Vitamin D deficiency 2020     Social History     Socioeconomic History    Marital status:    Tobacco Use    Smoking status: Former     Current packs/day: 0.00     Average packs/day: 1.5 packs/day for 30.0 years (45.0 ttl pk-yrs)     Types: Cigarettes     Start date: 1982     Quit date: 2012     Years since quittin.7     Passive exposure: Past    Smokeless tobacco: Never   Vaping Use    Vaping status: Never Used   Substance and Sexual Activity    Alcohol use: Not Currently     Comment: QUIT ---DRANK TOO MUCH 2X A WEEK    Drug use: Never    Sexual activity: Not Currently     Birth control/protection: Tubal ligation, Surgical       Problem list reviewed by Susu Crane, PharmD on 2025 at  9:14 AM    Hospitalizations and Urgent Care Since Last Assessment  ED Visits, Admissions or Hospitalizations: EGD on 25  Urgent Office Visits: None    Allergies  Known allergies and reactions were discussed with the patient. The patient's chart has been reviewed for allergy information and updated as necessary.   No Known Allergies    Allergies reviewed by Susu Crane, PharmD on 2025 at  9:12 AM  Allergies reviewed by Susu Crane PharmD on 2025 at 10:14 AM    Relevant Laboratory Values  A1C Last 3 Results          2024    09:06 10/2/2024    09:44 2025    08:56   HGBA1C Last 3 Results   Hemoglobin A1C 6.0  6.2     8.0       Details          This result is from an external source.             Lab Results   Component Value Date    HGBA1C 8.0 (A) 2025     Lab Results   Component Value Date    GLUCOSE 131 (H) 10/02/2024    CALCIUM 10.0 10/02/2024     10/02/2024    K 4.4 10/02/2024    CO2 26.7 10/02/2024     10/02/2024    BUN 21  "10/02/2024    CREATININE 0.82 10/02/2024    BCR 25.6 (H) 10/02/2024    ANIONGAP 11.3 10/02/2024     No results found for: \"CHOL\", \"CHLPL\", \"TRIG\", \"HDL\", \"LDL\", \"LDLDIRECT\"  Microalbumin          9/24/2024    11:36   Microalbumin   Microalbumin, Urine 51.0      Current Medication List  This medication list has been reviewed with the patient and evaluated for any interactions or necessary modifications/recommendations, and updated to include all prescription medications, OTC medications, and supplements the patient is currently taking. This list reflects what is contained in the patient's profile, which has also been marked as reviewed to communicate to other providers it is the most up to date version of the patient's current medication therapy.     Current Outpatient Medications:     Cholecalciferol (Vitamin D) 50 MCG (2000 UT) tablet, Take 1 tablet by mouth 1 (One) Time Per Week., Disp: , Rfl:     citalopram (CeleXA) 40 MG tablet, Take 1 tablet by mouth Daily., Disp: , Rfl:     ezetimibe (ZETIA) 10 MG tablet, Take 1 tablet by mouth Daily., Disp: , Rfl:     felodipine (PLENDIL) 5 MG 24 hr tablet, Take 1 tablet by mouth Daily., Disp: , Rfl:     furosemide (LASIX) 40 MG tablet, Take 1 tablet by mouth Daily., Disp: , Rfl:     metoprolol tartrate (LOPRESSOR) 25 MG tablet, Take 1 tablet by mouth 2 (Two) Times a Day., Disp: , Rfl:     pantoprazole (PROTONIX) 40 MG EC tablet, Take 1 tablet by mouth Daily., Disp: 90 tablet, Rfl: 3    Semaglutide, 1 MG/DOSE, (Ozempic, 1 MG/DOSE,) 2 MG/1.5ML solution pen-injector, Inject 1 mg under the skin into the appropriate area as directed 1 (One) Time Per Week., Disp: , Rfl:   No current facility-administered medications for this visit.    Medicines reviewed by Susu Crane, PharmD on 1/29/2025 at  9:13 AM    Drug Interactions  No significant drug-drug interactions with diabetes medications expected according to literature.    Recommended Medications Assessment  Aspirin: Not " Taking Currently  Statin: Not Taking Currently (on Zetia)  ACEi/ARB: Not Taking Currently    Adverse Drug Reactions  Adverse Reactions Experienced: None    Medication tolerability: Tolerating with no to minimal ADRs  Medication plan: Continue therapy with normal follow-up  Plan for ADR Management: None required     Adherence, Self-Administration, and Current Therapy Problems  Adherence related to the patient's specialty therapy was discussed with the patient. The Adherence segment of this outreach has been reviewed and updated.   Adherence Questions  Linked Medication(s) Assessed: Semaglutide (Ozempic (1 MG/DOSE))  On average, how many doses/injections does the patient miss per month?: 0  What are the identified reasons for non-adherence or missed doses? : no problems identified  What is the estimated medication adherence level?: %  Based on the patient/caregiver response and refill history, does this patient require an MTP to track adherence improvements?: no    Additional Barriers to Patient Self-Administration: None  Methods for Supporting Patient Self-Administration: None required    Recently Close Medication Therapy Problems  No medication therapy recommendations to display    Goals of Therapy  Goals related to the patient's specialty therapy was discussed with the patient. The Patient Goals segment of this outreach has been reviewed and updated.    Goals Addressed Today        Specialty Pharmacy General Goal      Minimize hypoglycemia             Quality of Life Assessment   Quality of Life related to the patient's specialty therapy was discussed with the patient. The QOL segment of this outreach has been reviewed and updated.    Quality of Life Improvement Scale: 7-Somewhat better    Reassessment Plan & Follow-Up  Patient's diabetes is now not at goal  with A1C 8% from 6.2% today.   Provider Medication Therapy Changes:  Per FIDELINA Marks she will order  Increase to Ozempic 2 mg  weekly  Jardiance 10 mg daily- samples given today and [place] to set up on PAP  Related Plans, Therapy Recommendations, or Issues to Be Addressed:   Patient utilizes PAP for Ozempic and now Jardiance   Pharmacist to perform regular reassessments no more than (6) months from the previous assessment.  Care Coordinator to set up future refill outreaches, coordinate prescription delivery, and escalate clinical questions to pharmacist.     Attestation  I attest the patient was actively involved in and has agreed to the above plan of care. If the prescribed therapy is at any point deemed not appropriate based on the current or future assessments, a consultation will be initiated with the patient's specialty care provider to determine the best course of action. The revised plan of therapy will be documented along with any required assessments and/or additional patient education provided.    Susu Crane, PharmD  Clinical Specialty Pharmacist, Endocrinology   1/29/2025  10:19 EST

## 2025-01-31 ENCOUNTER — SPECIALTY PHARMACY (OUTPATIENT)
Dept: PHARMACY | Facility: HOSPITAL | Age: 70
End: 2025-01-31
Payer: MEDICARE

## 2025-02-13 RX ORDER — SEMAGLUTIDE 1.34 MG/ML
1 INJECTION, SOLUTION SUBCUTANEOUS WEEKLY
Qty: 3 ML | Refills: 0 | Status: SHIPPED | OUTPATIENT
Start: 2025-02-13

## 2025-02-18 ENCOUNTER — SPECIALTY PHARMACY (OUTPATIENT)
Dept: PHARMACY | Facility: HOSPITAL | Age: 70
End: 2025-02-18
Payer: MEDICARE

## 2025-02-25 ENCOUNTER — OFFICE VISIT (OUTPATIENT)
Dept: ENDOCRINOLOGY | Facility: CLINIC | Age: 70
End: 2025-02-25
Payer: MEDICARE

## 2025-02-25 VITALS
OXYGEN SATURATION: 94 % | HEART RATE: 77 BPM | BODY MASS INDEX: 30.4 KG/M2 | WEIGHT: 171.6 LBS | SYSTOLIC BLOOD PRESSURE: 120 MMHG | DIASTOLIC BLOOD PRESSURE: 64 MMHG

## 2025-02-25 DIAGNOSIS — E11.65 TYPE 2 DIABETES MELLITUS WITH HYPERGLYCEMIA, WITH LONG-TERM CURRENT USE OF INSULIN: Primary | ICD-10-CM

## 2025-02-25 DIAGNOSIS — Z79.4 TYPE 2 DIABETES MELLITUS WITH HYPERGLYCEMIA, WITH LONG-TERM CURRENT USE OF INSULIN: Primary | ICD-10-CM

## 2025-02-25 LAB
EXPIRATION DATE: ABNORMAL
GLUCOSE BLDC GLUCOMTR-MCNC: 255 MG/DL (ref 70–130)
Lab: ABNORMAL

## 2025-02-25 NOTE — ASSESSMENT & PLAN NOTE
-Diabetes is above goal with BG's elevated.  -Discussed dietary and exercise guidelines. Discussed limiting carbs and sugars to reduce her fasting glucoses.  -Discussed importance of yearly eye exams.  -Discussed importance of checking BG's regularly.  -Increase Ozempic 2 mg weekly.  Patient has no personal history of pancreatitis, no family history of MEN syndrome or medullary thyroid cancer. Possible side effects including nausea, bloating, other GI upset and rarely pancreatitis were discussed. She was advised to call the office with any symptoms or concerns.   -Increase Jardiance 25 mg daily.   Discussed the medication's MOA and that it may cause more frequent urination particularly upon starting the medication  Take one tablet in the morning with or without food    Encouraged to drink plenty of water as to not get dehydrated especially in warmer weather or with activity  Also discussed there may be an increased incidence of UTIs or yeast infections and to monitor for signs/symptoms.  -S/S hypoglycemia reviewed with Rule of 15's advised.  -Follow-up in 3 months.

## 2025-02-25 NOTE — PROGRESS NOTES
Chief Complaint   Patient presents with    Diabetes     F/u last A1c 25 value 8.0        Diana Cassidy is a 69 y.o. female had concerns including Diabetes (F/u last A1c 25 value 8.0).    T2DM    She has been taking Ozempic 1 mg as her 2 mg dose has not came from  PAP.  She has been taking her medication regularly and not missing doses.  She reports that due to her medication changes due to this that she has noted increased fasting BG's in the 150-170's.  She is tolerating her medication well without adverse side effects.     T2DM:  Diabetes was diagnosed .  Complications include none.  Last ophtho exam was , Dr. Morillo.  Current medications for diabetes include Ozempic 2 mg weekly, Jardiance 10 mg QD.   She was having hypos and stopped her insulin and Farxiga and this has resolved.  She checks her blood sugar one times per day.   Hypos: none  FSB    Diet: She eats what she wants, she is not limiting much.    The following portions of the patient's history were reviewed and updated as appropriate: allergies, current medications, past family history, past medical history, past social history, past surgical history and problem list.    Past Medical History:   Diagnosis Date    Arthritis     Diabetes mellitus     Disease of thyroid gland     Elevated cholesterol     Fatty liver     Fatty liver disease, nonalcoholic     STATES SHE QUIT DRINKING    GERD (gastroesophageal reflux disease)     Hyperlipidemia NA    Don't remember    Hypertension 2007    Serum calcium elevated     Vitamin D deficiency      Past Surgical History:   Procedure Laterality Date    CHOLECYSTECTOMY      COLONOSCOPY      ENDOSCOPY      ENDOSCOPY N/A 2025    Procedure: ESOPHAGOGASTRODUODENOSCOPY WITH BIOPSY;  Surgeon: Dana Cohen MD;  Location: [facility];  Service: Gastroenterology;  Laterality: N/A;    GALLBLADDER SURGERY      HEMORROIDECTOMY      PARATHYROIDECTOMY N/A 2022     Procedure: PARATHYROIDECTOMY FROZEN SECTION INTROP PTH;  Surgeon: Moi Garrett MD;  Location: [facility];  Service: ENT;  Laterality: N/A;    TUBAL ABDOMINAL LIGATION      UPPER GASTROINTESTINAL ENDOSCOPY        Family History   Problem Relation Age of Onset    Cancer Mother         Lung cancer    Cancer Father         Pancreatic/ Lung Cancer    Cancer Sister         Lymphoma/bladder    Cancer Sister         Bladder Cancer    Cancer Brother         Prostate Cancer    Breast cancer Neg Hx       Social History     Socioeconomic History    Marital status:    Tobacco Use    Smoking status: Former     Current packs/day: 0.00     Average packs/day: 1.5 packs/day for 30.0 years (45.0 ttl pk-yrs)     Types: Cigarettes     Start date: 1982     Quit date: 2012     Years since quittin.8     Passive exposure: Past    Smokeless tobacco: Never   Vaping Use    Vaping status: Never Used   Substance and Sexual Activity    Alcohol use: Not Currently     Comment: QUIT ---DRANK TOO MUCH 2X A WEEK    Drug use: Never    Sexual activity: Not Currently     Birth control/protection: Tubal ligation, Surgical      No Known Allergies   Current Outpatient Medications on File Prior to Visit   Medication Sig Dispense Refill    Cholecalciferol (Vitamin D) 50 MCG ( UT) tablet Take 1 tablet by mouth 1 (One) Time Per Week.      citalopram (CeleXA) 40 MG tablet Take 1 tablet by mouth Daily.      empagliflozin (Jardiance) 10 MG tablet tablet       ezetimibe (ZETIA) 10 MG tablet Take 1 tablet by mouth Daily.      felodipine (PLENDIL) 5 MG 24 hr tablet Take 1 tablet by mouth Daily.      furosemide (LASIX) 40 MG tablet Take 1 tablet by mouth Daily.      metoprolol tartrate (LOPRESSOR) 25 MG tablet Take 1 tablet by mouth 2 (Two) Times a Day.      pantoprazole (PROTONIX) 40 MG EC tablet Take 1 tablet by mouth Daily. 90 tablet 3    Semaglutide, 1 MG/DOSE, (Ozempic, 1 MG/DOSE,) 4 MG/3ML solution pen-injector Inject 1 mg  under the skin into the appropriate area as directed 1 (One) Time Per Week. 3 mL 0     No current facility-administered medications on file prior to visit.      Review of Systems   Constitutional:  Positive for fatigue. Negative for activity change, appetite change, chills, diaphoresis, fever, unexpected weight gain and unexpected weight loss.   Eyes:  Positive for blurred vision and visual disturbance.   Cardiovascular:  Negative for palpitations.   Gastrointestinal:  Positive for diarrhea.   Endocrine: Negative for cold intolerance, heat intolerance, polydipsia, polyphagia and polyuria.   Musculoskeletal: Negative.    Skin:  Positive for dry skin.   Neurological:  Negative for dizziness, weakness, light-headedness, numbness, headache, memory problem and confusion.   Hematological:  Bruises/bleeds easily.   Psychiatric/Behavioral:  Negative for agitation and sleep disturbance. The patient is not nervous/anxious.    All other systems reviewed and are negative.    /64 (BP Location: Right arm, Patient Position: Sitting, Cuff Size: Adult)   Pulse 77   Wt 77.8 kg (171 lb 9.6 oz)   LMP  (LMP Unknown)   SpO2 94%   BMI 30.40 kg/m²      Physical Exam  Vitals reviewed.   Constitutional:       Appearance: Normal appearance.   Eyes:      Extraocular Movements: Extraocular movements intact.   Cardiovascular:      Rate and Rhythm: Normal rate.   Pulmonary:      Effort: Pulmonary effort is normal.   Skin:     General: Skin is warm.   Neurological:      Mental Status: She is alert and oriented to person, place, and time.   Psychiatric:         Mood and Affect: Mood normal.         Behavior: Behavior normal.         Thought Content: Thought content normal.         Judgment: Judgment normal.        Labs/Imaging  CMP:  Lab Results   Component Value Date    Glucose 255 (A) 02/25/2025    BUN 21 10/02/2024    BUN/Creatinine Ratio 25.6 (H) 10/02/2024    Creatinine 0.82 10/02/2024    Creatinine, Urine 324.1 09/24/2024    CO2  "26.7 10/02/2024    Calcium 10.0 10/02/2024    Calcium, 24H Urine 465.8 (H) 01/20/2022    Calcium, Urine 54.8 01/20/2022    Albumin 4.2 10/02/2024    AST (SGOT) 20 10/02/2024    ALT (SGPT) 10 10/02/2024     Lipid Panel:  No results found for: \"CHOL\", \"TRIG\", \"HDL\", \"VLDL\", \"LDL\"  HbA1c:  Lab Results   Component Value Date    HGBA1C 8.0 (A) 01/29/2025   10/2023: 5.9    Glucose:  Lab Results   Component Value Date    POCGLU 255 (A) 02/25/2025     TSH:  Lab Results   Component Value Date    TSH 2.400 01/19/2022       Assessment and Plan    Diagnoses and all orders for this visit:    1. Type 2 diabetes mellitus with hyperglycemia, with long-term current use of insulin (Primary)  Assessment & Plan:  -Diabetes is above goal with BG's elevated.  -Discussed dietary and exercise guidelines. Discussed limiting carbs and sugars to reduce her fasting glucoses.  -Discussed importance of yearly eye exams.  -Discussed importance of checking BG's regularly.  -Increase Ozempic 2 mg weekly.  Patient has no personal history of pancreatitis, no family history of MEN syndrome or medullary thyroid cancer. Possible side effects including nausea, bloating, other GI upset and rarely pancreatitis were discussed. She was advised to call the office with any symptoms or concerns.   -Increase Jardiance 25 mg daily.   Discussed the medication's MOA and that it may cause more frequent urination particularly upon starting the medication  Take one tablet in the morning with or without food    Encouraged to drink plenty of water as to not get dehydrated especially in warmer weather or with activity  Also discussed there may be an increased incidence of UTIs or yeast infections and to monitor for signs/symptoms.  -S/S hypoglycemia reviewed with Rule of 15's advised.  -Follow-up in 3 months.    Orders:  -     POC Glucose, Blood    Other orders  -     EYE EXAM SCANNED         Return in about 2 months (around 4/25/2025) for Follow-up appointment, A1C. The " patient was instructed to contact the clinic with any interval questions or concerns.    This document has been electronically signed by FIDELINA Marks  February 25, 2025 14:38 EST  Endocrinology    Please note that portions of this document were completed with a voice recognition program. Efforts were made to edit the dictations, but occasionally words are mis-transcribed.

## 2025-04-07 ENCOUNTER — TRANSCRIBE ORDERS (OUTPATIENT)
Dept: ADMINISTRATIVE | Facility: HOSPITAL | Age: 70
End: 2025-04-07
Payer: MEDICARE

## 2025-04-07 DIAGNOSIS — Z12.31 VISIT FOR SCREENING MAMMOGRAM: ICD-10-CM

## 2025-04-07 DIAGNOSIS — Z78.0 ASYMPTOMATIC POSTMENOPAUSAL STATUS: Primary | ICD-10-CM

## 2025-05-01 ENCOUNTER — OFFICE VISIT (OUTPATIENT)
Dept: ENDOCRINOLOGY | Facility: CLINIC | Age: 70
End: 2025-05-01
Payer: MEDICARE

## 2025-05-01 VITALS
DIASTOLIC BLOOD PRESSURE: 68 MMHG | BODY MASS INDEX: 29.8 KG/M2 | HEART RATE: 74 BPM | OXYGEN SATURATION: 94 % | WEIGHT: 168.2 LBS | SYSTOLIC BLOOD PRESSURE: 137 MMHG

## 2025-05-01 DIAGNOSIS — Z79.4 TYPE 2 DIABETES MELLITUS WITH HYPERGLYCEMIA, WITH LONG-TERM CURRENT USE OF INSULIN: Primary | ICD-10-CM

## 2025-05-01 DIAGNOSIS — E11.65 TYPE 2 DIABETES MELLITUS WITH HYPERGLYCEMIA, WITH LONG-TERM CURRENT USE OF INSULIN: Primary | ICD-10-CM

## 2025-05-01 LAB
EXPIRATION DATE: ABNORMAL
GLUCOSE BLDC GLUCOMTR-MCNC: 160 MG/DL (ref 70–130)
Lab: ABNORMAL

## 2025-05-01 RX ORDER — GLIPIZIDE 10 MG/1
10 TABLET, FILM COATED, EXTENDED RELEASE ORAL DAILY
Qty: 90 TABLET | Refills: 1 | Status: SHIPPED | OUTPATIENT
Start: 2025-05-01 | End: 2026-05-01

## 2025-05-01 NOTE — ASSESSMENT & PLAN NOTE
-Diabetes is above goal with A1c 7.8.  -Discussed dietary and exercise guidelines. Discussed limiting carbs and sugars to reduce her fasting glucoses.  -Discussed importance of yearly eye exams.  -Discussed importance of checking BG's regularly.  -Continue Ozempic 2 mg weekly.  Patient has no personal history of pancreatitis, no family history of MEN syndrome or medullary thyroid cancer. Possible side effects including nausea, bloating, other GI upset and rarely pancreatitis were discussed. She was advised to call the office with any symptoms or concerns.   -Continue Jardiance 25 mg daily.   Discussed the medication's MOA and that it may cause more frequent urination particularly upon starting the medication  Take one tablet in the morning with or without food    Encouraged to drink plenty of water as to not get dehydrated especially in warmer weather or with activity  Also discussed there may be an increased incidence of UTIs or yeast infections and to monitor for signs/symptoms.  -Start Glipizide 10 mg XL QD.  -S/S hypoglycemia reviewed with Rule of 15's advised.  -Follow-up in 3 months.

## 2025-05-01 NOTE — PROGRESS NOTES
Chief Complaint   Patient presents with    Diabetes     F/u, last A1c 25 value 7.8        Diana Cassidy is a 70 y.o. female had concerns including Diabetes (F/u, last A1c 25 value 7.8).    T2DM    She denies any issues today other than that she has been having some increased vomiting when eating/drinking dairy and thinks that she may have some lactose intolerance. She has been taking her medication regularly without missing doses and denies any adverse effects to her medication.     T2DM:  Diabetes was diagnosed .  Complications include none.  Last ophtho exam was , Dr. Morillo.  Current medications for diabetes include Ozempic 2 mg weekly, Jardiance 25 mg QD.   She checks her blood sugar one times per day.   Hypos: none  FSB    Diet: She eats what she wants, she is not limiting much.    The following portions of the patient's history were reviewed and updated as appropriate: allergies, current medications, past family history, past medical history, past social history, past surgical history and problem list.    Past Medical History:   Diagnosis Date    Arthritis     Diabetes mellitus     Disease of thyroid gland     Elevated cholesterol     Fatty liver     Fatty liver disease, nonalcoholic     STATES SHE QUIT DRINKING    GERD (gastroesophageal reflux disease)     Hyperlipidemia NA    Don't remember    Hypertension     Serum calcium elevated     Vitamin D deficiency      Past Surgical History:   Procedure Laterality Date    CHOLECYSTECTOMY      COLONOSCOPY      ENDOSCOPY      ENDOSCOPY N/A 2025    Procedure: ESOPHAGOGASTRODUODENOSCOPY WITH BIOPSY;  Surgeon: Dana Cohen MD;  Location: [facility];  Service: Gastroenterology;  Laterality: N/A;    GALLBLADDER SURGERY      HEMORROIDECTOMY      PARATHYROIDECTOMY N/A 2022    Procedure: PARATHYROIDECTOMY FROZEN SECTION INTROP PTH;  Surgeon: Moi Garrett MD;  Location: [facility];  Service: ENT;  Laterality:  N/A;    TUBAL ABDOMINAL LIGATION      UPPER GASTROINTESTINAL ENDOSCOPY        Family History   Problem Relation Age of Onset    Cancer Mother         Lung cancer    Cancer Father         Pancreatic/ Lung Cancer    Cancer Sister         Lymphoma/bladder    Cancer Sister         Bladder Cancer    Cancer Brother         Prostate Cancer    Cancer Sister         Lymphoma/bladder    Breast cancer Neg Hx       Social History     Socioeconomic History    Marital status:    Tobacco Use    Smoking status: Former     Current packs/day: 0.00     Average packs/day: 1.5 packs/day for 30.0 years (45.0 ttl pk-yrs)     Types: Cigarettes     Start date: 1982     Quit date: 2012     Years since quittin.9     Passive exposure: Past    Smokeless tobacco: Never   Vaping Use    Vaping status: Never Used   Substance and Sexual Activity    Alcohol use: Not Currently     Comment: QUIT ---DRANK TOO MUCH 2X A WEEK    Drug use: Never    Sexual activity: Not Currently     Birth control/protection: Tubal ligation, Surgical      No Known Allergies   Current Outpatient Medications on File Prior to Visit   Medication Sig Dispense Refill    Cholecalciferol (Vitamin D) 50 MCG ( UT) tablet Take 1 tablet by mouth 1 (One) Time Per Week.      citalopram (CeleXA) 40 MG tablet Take 1 tablet by mouth Daily.      empagliflozin (Jardiance) 25 MG tablet tablet Take 1 tablet by mouth Daily. Receiving from patient assistance program 90 tablet 1    ezetimibe (ZETIA) 10 MG tablet Take 1 tablet by mouth Daily.      felodipine (PLENDIL) 5 MG 24 hr tablet Take 1 tablet by mouth Daily.      furosemide (LASIX) 40 MG tablet Take 1 tablet by mouth Daily.      metoprolol tartrate (LOPRESSOR) 25 MG tablet Take 1 tablet by mouth 2 (Two) Times a Day.      pantoprazole (PROTONIX) 40 MG EC tablet Take 1 tablet by mouth Daily. 90 tablet 3    Semaglutide, 2 MG/DOSE, (OZEMPIC) 8 MG/3ML solution pen-injector Inject 2 mg under the skin into the  appropriate area as directed 1 (One) Time Per Week. Receiving from patient assistance program       No current facility-administered medications on file prior to visit.      Review of Systems   Constitutional:  Positive for fatigue. Negative for activity change, appetite change, chills, diaphoresis, fever, unexpected weight gain and unexpected weight loss.   Eyes:  Positive for blurred vision and visual disturbance.   Cardiovascular:  Negative for palpitations.   Gastrointestinal:  Positive for diarrhea.   Endocrine: Negative for cold intolerance, heat intolerance, polydipsia, polyphagia and polyuria.   Musculoskeletal: Negative.    Skin:  Positive for dry skin.   Neurological:  Negative for dizziness, weakness, light-headedness, numbness, headache, memory problem and confusion.   Hematological:  Bruises/bleeds easily.   Psychiatric/Behavioral:  Negative for agitation and sleep disturbance. The patient is not nervous/anxious.    All other systems reviewed and are negative.    /68 (BP Location: Right arm, Patient Position: Sitting, Cuff Size: Adult)   Pulse 74   Wt 76.3 kg (168 lb 3.2 oz)   LMP  (LMP Unknown)   SpO2 94%   BMI 29.80 kg/m²      Physical Exam  Vitals reviewed.   Constitutional:       Appearance: Normal appearance.   Eyes:      Extraocular Movements: Extraocular movements intact.   Cardiovascular:      Rate and Rhythm: Normal rate.   Pulmonary:      Effort: Pulmonary effort is normal.   Skin:     General: Skin is warm.   Neurological:      Mental Status: She is alert and oriented to person, place, and time.   Psychiatric:         Mood and Affect: Mood normal.         Behavior: Behavior normal.         Thought Content: Thought content normal.         Judgment: Judgment normal.        Labs/Imaging  CMP:  Lab Results   Component Value Date    Glucose 160 (A) 05/01/2025    BUN 21 10/02/2024    BUN/Creatinine Ratio 25.6 (H) 10/02/2024    Creatinine 0.82 10/02/2024    Creatinine, Urine 324.1  "09/24/2024    CO2 26.7 10/02/2024    Calcium 10.0 10/02/2024    Calcium, 24H Urine 465.8 (H) 01/20/2022    Calcium, Urine 54.8 01/20/2022    Albumin 4.2 10/02/2024    AST (SGOT) 20 10/02/2024    ALT (SGPT) 10 10/02/2024     Lipid Panel:  No results found for: \"CHOL\", \"TRIG\", \"HDL\", \"VLDL\", \"LDL\"  HbA1c:  Lab Results   Component Value Date    HGBA1C 7.8 (H) 04/07/2025   10/2023: 5.9    Glucose:  Lab Results   Component Value Date    POCGLU 160 (A) 05/01/2025     TSH:  Lab Results   Component Value Date    TSH 2.400 01/19/2022       Assessment and Plan    Diagnoses and all orders for this visit:    1. Type 2 diabetes mellitus with hyperglycemia, with long-term current use of insulin (Primary)  Assessment & Plan:  -Diabetes is above goal with A1c 7.8.  -Discussed dietary and exercise guidelines. Discussed limiting carbs and sugars to reduce her fasting glucoses.  -Discussed importance of yearly eye exams.  -Discussed importance of checking BG's regularly.  -Continue Ozempic 2 mg weekly.  Patient has no personal history of pancreatitis, no family history of MEN syndrome or medullary thyroid cancer. Possible side effects including nausea, bloating, other GI upset and rarely pancreatitis were discussed. She was advised to call the office with any symptoms or concerns.   -Continue Jardiance 25 mg daily.   Discussed the medication's MOA and that it may cause more frequent urination particularly upon starting the medication  Take one tablet in the morning with or without food    Encouraged to drink plenty of water as to not get dehydrated especially in warmer weather or with activity  Also discussed there may be an increased incidence of UTIs or yeast infections and to monitor for signs/symptoms.  -Start Glipizide 10 mg XL QD.  -S/S hypoglycemia reviewed with Rule of 15's advised.  -Follow-up in 3 months.    Orders:  -     POC Glucose, Blood    Other orders  -     glipizide (GLUCOTROL XL) 10 MG 24 hr tablet; Take 1 tablet by " mouth Daily.  Dispense: 90 tablet; Refill: 1         Return in about 3 months (around 8/1/2025) for Follow-up appointment, A1C. The patient was instructed to contact the clinic with any interval questions or concerns.    This document has been electronically signed by FIDELINA Marks  May 1, 2025 15:50 EDT  Endocrinology    Please note that portions of this document were completed with a voice recognition program. Efforts were made to edit the dictations, but occasionally words are mis-transcribed.

## 2025-06-12 ENCOUNTER — HOSPITAL ENCOUNTER (OUTPATIENT)
Dept: MAMMOGRAPHY | Facility: HOSPITAL | Age: 70
Discharge: HOME | End: 2025-06-12
Admitting: RADIOLOGY
Payer: MEDICARE

## 2025-06-12 DIAGNOSIS — R92.8 ABNORMAL MAMMOGRAM: ICD-10-CM

## 2025-06-12 PROCEDURE — 77065 DX MAMMO INCL CAD UNI: CPT

## 2025-06-13 ENCOUNTER — OFFICE VISIT (OUTPATIENT)
Dept: GASTROENTEROLOGY | Facility: CLINIC | Age: 70
End: 2025-06-13
Payer: MEDICARE

## 2025-06-13 VITALS
BODY MASS INDEX: 29.59 KG/M2 | DIASTOLIC BLOOD PRESSURE: 63 MMHG | WEIGHT: 167 LBS | HEIGHT: 63 IN | HEART RATE: 86 BPM | SYSTOLIC BLOOD PRESSURE: 108 MMHG

## 2025-06-13 DIAGNOSIS — K70.30 ALCOHOLIC CIRRHOSIS OF LIVER WITHOUT ASCITES: ICD-10-CM

## 2025-06-13 DIAGNOSIS — R11.2 NAUSEA AND VOMITING, UNSPECIFIED VOMITING TYPE: ICD-10-CM

## 2025-06-13 DIAGNOSIS — K21.9 GASTROESOPHAGEAL REFLUX DISEASE, UNSPECIFIED WHETHER ESOPHAGITIS PRESENT: ICD-10-CM

## 2025-06-13 DIAGNOSIS — K75.81 METABOLIC DYSFUNCTION-ASSOCIATED STEATOHEPATITIS (MASH): Primary | ICD-10-CM

## 2025-06-13 PROCEDURE — 1160F RVW MEDS BY RX/DR IN RCRD: CPT

## 2025-06-13 PROCEDURE — 1159F MED LIST DOCD IN RCRD: CPT

## 2025-06-13 PROCEDURE — 99214 OFFICE O/P EST MOD 30 MIN: CPT

## 2025-06-13 NOTE — PROGRESS NOTES
Chief Complaint  Cirrhosis    Diana Cassidy is a 70 y.o. female who presents today to [facility] for Cirrhosis.    HPI:   69-year-old female with PMH of DM2 on Mounjaro, alcoholic/MASH cirrhosis who presents today to establish care for cirrhosis.  Patient has followed with Dr. Kuo in the past.  Recent ultrasound performed in June 2023 was notable for stable heterogeneous borderline prominent liver size without focal or acute abnormality.  Most recent LFTs performed on 10/2/2024 was notable for LFTs WNL.  CBC with normal platelets as well.  Patient had colonoscopy performed on 1/31/2023 that was notable for diverticulitis and internal hemorrhoids.  Repeat colonoscopy was recommended in 5 years, January 2028.     Today, patient states that she has been doing well.  She denies abdominal distention, jaundice, rash, pruritus, abdominal pain, nausea, vomiting, hematemesis, melena, and other stigmata of liver disease.  She denies GERD, dysphagia, unintentional weight loss, changes in bowel habits, melena, and hematochezia.  Reports having 3-4 bowel movements per week and feels that she can evacuates her colon well.  She has no complaints today.  Liver ultrasound was notable for cirrhosis of the liver.  EGD was ordered.  However, patient missed his appointment.  We will plan to reschedule on next visit.     12/06/2024: Today, patient presents for follow-up.  She denies any stigmata of liver disease.  She reports that she has been well.  No changes in her bowel habits or melena or hematochezia.  She denies unintentional weight loss, GERD, dysphagia, and abdominal pain.  Patient unfortunately had to cancel her last EGD.  Will plan to reschedule this today.  EGD was performed on 1/16/2025 with Dr. Anna.  This was notable for LA grade a reflux esophagitis without bleeding, small hiatal hernia, erythematous mucosa in the antrum, normal duodenum.  Patient was initiated on  Protonix 40 mg daily.  Biopsies esophagus were benign, biopsies of the stomach were negative for H. pylori.       Interval History:    Today, patient presents for follow-up.  She reports that her GERD has been well-controlled on Protonix 40 mg once daily.  Patient has not had any flares since initiating this medication.  She notes some vomiting after eating ice cream and dairy products.  Patient has discontinued dairy products and has not vomited since.  She is drinking Lactaid milk and is taking Lactaid tablets as needed.  She reports regular bowel movements with complete evacuation of her colon.  She denies unintentional weight loss, hematemesis, abdominal pain, dysphagia, melena, hematochezia, and BRBPR.  She denies any stigmata of liver disease.  Of note, patient does have a history of alcohol abuse but has been abstaining for several years now.  Most recent labs collected in April 2025 with normal liver enzymes and normal platelets.        Previous History:   Past Medical History:   Diagnosis Date    Arthritis     Diabetes mellitus     Disease of thyroid gland     Elevated cholesterol     Fatty liver     Fatty liver disease, nonalcoholic     STATES SHE QUIT DRINKING    GERD (gastroesophageal reflux disease)     Hyperlipidemia NA    Don't remember    Hypertension 2007    Serum calcium elevated     Vitamin D deficiency 2020      Past Surgical History:   Procedure Laterality Date    CHOLECYSTECTOMY      COLONOSCOPY      ENDOSCOPY      ENDOSCOPY N/A 01/16/2025    Procedure: ESOPHAGOGASTRODUODENOSCOPY WITH BIOPSY;  Surgeon: Dana Cohen MD;  Location: [facility];  Service: Gastroenterology;  Laterality: N/A;    GALLBLADDER SURGERY      HEMORRHOIDECTOMY  2024    HEMORROIDECTOMY      PARATHYROIDECTOMY N/A 09/07/2022    Procedure: PARATHYROIDECTOMY FROZEN SECTION INTROP PTH;  Surgeon: Moi Garrett MD;  Location: [facility] OR;  Service: ENT;  Laterality: N/A;    TUBAL ABDOMINAL LIGATION      UPPER  "GASTROINTESTINAL ENDOSCOPY        Social History     Socioeconomic History    Marital status:    Tobacco Use    Smoking status: Former     Current packs/day: 0.00     Average packs/day: 1.5 packs/day for 30.0 years (45.0 ttl pk-yrs)     Types: Cigarettes     Start date: 1982     Quit date: 2012     Years since quittin.1     Passive exposure: Past    Smokeless tobacco: Never   Vaping Use    Vaping status: Never Used   Substance and Sexual Activity    Alcohol use: Not Currently     Comment: QUIT ---DRANK TOO MUCH 2X A WEEK    Drug use: Never    Sexual activity: Not Currently     Birth control/protection: Tubal ligation, Surgical        Current Medications:  Current Outpatient Medications   Medication Sig Dispense Refill    Cholecalciferol (Vitamin D) 50 MCG (2000 UT) tablet Take 1 tablet by mouth 1 (One) Time Per Week.      citalopram (CeleXA) 40 MG tablet Take 1 tablet by mouth Daily.      empagliflozin (Jardiance) 25 MG tablet tablet Take 1 tablet by mouth Daily. Receiving from patient assistance program 90 tablet 1    ezetimibe (ZETIA) 10 MG tablet Take 1 tablet by mouth Daily.      felodipine (PLENDIL) 5 MG 24 hr tablet Take 1 tablet by mouth Daily.      furosemide (LASIX) 40 MG tablet Take 1 tablet by mouth Daily.      glipizide (GLUCOTROL XL) 10 MG 24 hr tablet Take 1 tablet by mouth Daily. 90 tablet 1    metoprolol tartrate (LOPRESSOR) 25 MG tablet Take 1 tablet by mouth 2 (Two) Times a Day.      pantoprazole (PROTONIX) 40 MG EC tablet Take 1 tablet by mouth Daily. 90 tablet 3    Semaglutide, 2 MG/DOSE, (OZEMPIC) 8 MG/3ML solution pen-injector Inject 2 mg under the skin into the appropriate area as directed 1 (One) Time Per Week. Receiving from patient assistance program       No current facility-administered medications for this visit.       Allergies:   No Known Allergies    Vitals:   /63   Pulse 86   Ht 160 cm (63\")   Wt 75.8 kg (167 lb)   LMP  (LMP Unknown)   BMI 29.58 " "kg/m²   Estimated body mass index is 29.58 kg/m² as calculated from the following:    Height as of this encounter: 160 cm (63\").    Weight as of this encounter: 75.8 kg (167 lb).    ROS:   Review of Systems   Constitutional: Negative.    HENT: Negative.     Cardiovascular: Negative.    Gastrointestinal:  Positive for nausea and vomiting. Negative for abdominal distention, abdominal pain, anal bleeding, blood in stool, constipation, diarrhea and rectal pain.   All other systems reviewed and are negative.       Physical Exam:   Physical Exam  Vitals reviewed.   Constitutional:       General: She is not in acute distress.     Appearance: Normal appearance. She is well-groomed. She is not toxic-appearing.   HENT:      Head: Normocephalic and atraumatic.      Mouth/Throat:      Mouth: Mucous membranes are moist.   Eyes:      Extraocular Movements: Extraocular movements intact.   Cardiovascular:      Rate and Rhythm: Normal rate and regular rhythm.      Heart sounds: Normal heart sounds. No murmur heard.  Pulmonary:      Effort: Pulmonary effort is normal. No respiratory distress.      Breath sounds: Normal breath sounds. No stridor. No wheezing or rales.   Abdominal:      General: Bowel sounds are normal. There is no distension.      Palpations: Abdomen is soft. There is no mass.      Tenderness: There is no abdominal tenderness. There is no guarding or rebound.      Hernia: No hernia is present.   Skin:     General: Skin is warm.      Coloration: Skin is not jaundiced.      Findings: No rash.   Neurological:      Mental Status: She is alert and oriented to person, place, and time.   Psychiatric:         Mood and Affect: Mood and affect normal.         Speech: Speech normal.         Behavior: Behavior is cooperative.          Lab Results:   Lab Results   Component Value Date    WBC 5.5 04/07/2025    HGB 14.6 04/07/2025    HCT 43.9 04/07/2025    MCV 91 04/07/2025    RDW 12.2 04/07/2025     04/07/2025    " NEUTRORELPCT 66 04/07/2025    LYMPHORELPCT 22 04/07/2025    MONORELPCT 9 04/07/2025    EOSRELPCT 2 04/07/2025    BASORELPCT 1 04/07/2025    NEUTROABS 3.7 04/07/2025    LYMPHSABS 1.2 04/07/2025       Lab Results   Component Value Date     10/02/2024    K 4.4 10/02/2024    CO2 26.7 10/02/2024     10/02/2024    BUN 21 10/02/2024    CREATININE 0.82 10/02/2024    GLUCOSE 131 (H) 10/02/2024    CALCIUM 10.0 10/02/2024    ALKPHOS 49 10/02/2024    AST 20 10/02/2024    ALT 10 10/02/2024    BILITOT 0.3 10/02/2024    ALBUMIN 4.2 10/02/2024    PROTEINTOT 6.7 10/02/2024    PHOS 2.5 01/19/2022       Pathology:        Endoscopy:        Imaging:   Mammo Diagnostic Left With CAD  Result Date: 6/12/2025  Probably benign left mammographic findings.   BI-RADS CATEGORY: 3, PROBABLY BENIGN   RECOMMENDATION: The patient will be due for her next bilateral diagnostic mammogram to include left magnification views after 11/26/2025   The standard false-negative rate of mammography is between 10% and 25%. Complex patterns or increased breast density will markedly elevate the false-negative rate of mammography.    The patient was notified of the results and recommendations at the time of her visit.  Additionally, a letter, in lay terminology, with the results of this exam will be mailed to the patient.   This report was finalized on 6/12/2025 2:56 PM by Dr. Aidee Alfaro MD.          Results review: During today's encounter, all relevant clinical data has been reviewed.      Assessment and Plan    1. Metabolic dysfunction-associated steatohepatitis (MASH) (Primary)  2. Alcoholic cirrhosis of liver without ascites  Educated patient on importance of weight loss, healthy diet, and daily exercise as this is essentially the only way to prevent progression of disease to cirrhosis. Encouraged patient to follow closely with PCP to develop an individualized plan for diet/exercise in order to promote healthy weight loss. Patient was advised  that fatty infiltrate of the liver is not a benign condition and can lead to serious liver disease, even cirrhosis, possible liver transplant and hepatocellular carcinoma. Patient will have ultrasound of the liver at least annually and hepatic panel every 6 months for monitoring.  Will obtain liver ultrasound.  -     US Liver; Future    3. Nausea and vomiting, unspecified vomiting type  Directly correlated with dairy products.  Patient has been avoiding dairy products and reports resolution symptoms.  Likely related to lactose intolerance.    4. Gastroesophageal reflux disease, unspecified whether esophagitis present  Well-controlled on Protonix 40 mg once daily.  Please continue.    New Medications:   No orders of the defined types were placed in this encounter.      Discontinued Medications:   There are no discontinued medications.     Visit Diagnoses:    ICD-10-CM ICD-9-CM   1. Metabolic dysfunction-associated steatohepatitis (MASH)  K75.81 571.8   2. Alcoholic cirrhosis of liver without ascites  K70.30 571.2   3. Nausea and vomiting, unspecified vomiting type  R11.2 787.01   4. Gastroesophageal reflux disease, unspecified whether esophagitis present  K21.9 530.81            Follow Up:   Return in about 5 months (around 11/13/2025).    The patient was in agreement with the plan and all questions were answered to patient's satisfaction.        This document has been electronically signed by Amira Hoffman PA-C   June 13, 2025 15:59 EDT    Dictated Utilizing Dragon Dictation: Part of this note may be an electronic transcription/translation of spoken language to printed text using the Dragon Dictation System.    CC:  No ref. provider found  Chelsea Rodriguez APRN

## 2025-06-20 ENCOUNTER — HOSPITAL ENCOUNTER (OUTPATIENT)
Dept: ULTRASOUND IMAGING | Facility: HOSPITAL | Age: 70
Discharge: HOME | End: 2025-06-20
Payer: MEDICARE

## 2025-06-20 DIAGNOSIS — K75.81 METABOLIC DYSFUNCTION-ASSOCIATED STEATOHEPATITIS (MASH): ICD-10-CM

## 2025-06-20 DIAGNOSIS — K70.30 ALCOHOLIC CIRRHOSIS OF LIVER WITHOUT ASCITES: ICD-10-CM

## 2025-06-20 PROCEDURE — 76705 ECHO EXAM OF ABDOMEN: CPT

## 2025-06-25 ENCOUNTER — RESULTS FOLLOW-UP (OUTPATIENT)
Dept: GASTROENTEROLOGY | Facility: CLINIC | Age: 70
End: 2025-06-25
Payer: MEDICARE

## 2025-06-30 ENCOUNTER — TRANSCRIBE ORDERS (OUTPATIENT)
Dept: ADMINISTRATIVE | Facility: HOSPITAL | Age: 70
End: 2025-06-30
Payer: MEDICARE

## 2025-06-30 DIAGNOSIS — F17.200 TOBACCO USE DISORDER: Primary | ICD-10-CM

## 2025-07-14 ENCOUNTER — TELEPHONE (OUTPATIENT)
Dept: ENDOCRINOLOGY | Facility: CLINIC | Age: 70
End: 2025-07-14

## 2025-07-28 ENCOUNTER — HOSPITAL ENCOUNTER (OUTPATIENT)
Dept: CT IMAGING | Facility: HOSPITAL | Age: 70
Discharge: HOME | End: 2025-07-28
Admitting: NURSE PRACTITIONER
Payer: MEDICARE

## 2025-07-28 DIAGNOSIS — F17.200 TOBACCO USE DISORDER: ICD-10-CM

## 2025-07-28 PROCEDURE — 71271 CT THORAX LUNG CANCER SCR C-: CPT

## 2025-07-29 PROCEDURE — 71271 CT THORAX LUNG CANCER SCR C-: CPT | Performed by: RADIOLOGY

## (undated) DEVICE — PK HD AND NK 70

## (undated) DEVICE — Device

## (undated) DEVICE — ROSEBUD DISSECTORS: Brand: DEROYAL

## (undated) DEVICE — THE BITE BLOCK MAXI, LATEX FREE STRAP IS USED TO PROTECT THE ENDOSCOPE INSERTION TUBE FROM BEING BITTEN BY THE PATIENT.

## (undated) DEVICE — ANTIBACTERIAL UNDYED BRAIDED (POLYGLACTIN 910), SYNTHETIC ABSORBABLE SUTURE: Brand: COATED VICRYL

## (undated) DEVICE — Device: Brand: DEFENDO AIR/WATER/SUCTION AND BIOPSY VALVE

## (undated) DEVICE — FRCP BX RADJAW4 NDL 2.8 240CM LG OG BX40

## (undated) DEVICE — GLV SURG PREMIERPRO MIC LTX PF SZ7.5 BRN

## (undated) DEVICE — 3M™ STERI-STRIP™ REINFORCED ADHESIVE SKIN CLOSURES, R1547, 1/2 IN X 4 IN (12 MM X 100 MM), 6 STRIPS/ENVELOPE: Brand: 3M™ STERI-STRIP™

## (undated) DEVICE — EMG TUBE 8229707 NIM TRIVANTAGE 7.0MM ID: Brand: NIM TRIVANTAGE™

## (undated) DEVICE — PROBE 8225825 3PK INCREMT STD PRASS ROHS

## (undated) DEVICE — ENDOGATOR TUBING FOR ENDOGATOR EGP-100 IRRIGATION PUMP,OLYMPUS OFP PUMP, OLYMPUS AFU-100 PUMP AND ERBE EIP2 PUMP: Brand: ENDOGATOR

## (undated) DEVICE — AMD ANTIMICROBIAL NON-ADHERENT ISLAND DRESSING,0.2% POLYHEXAMETHYLENE BIGUANIDE HCI (PHMB): Brand: TELFA

## (undated) DEVICE — SUT VIC 2/0 TIES 18IN J111T

## (undated) DEVICE — SUT VIC 3/0 TIES J104T

## (undated) DEVICE — SUT SILK 2/0 TIES 30IN SA85H

## (undated) DEVICE — HOLDER: Brand: DEROYAL